# Patient Record
Sex: MALE | Race: WHITE | NOT HISPANIC OR LATINO | URBAN - METROPOLITAN AREA
[De-identification: names, ages, dates, MRNs, and addresses within clinical notes are randomized per-mention and may not be internally consistent; named-entity substitution may affect disease eponyms.]

---

## 2017-10-12 ENCOUNTER — INPATIENT (INPATIENT)
Facility: HOSPITAL | Age: 57
LOS: 2 days | Discharge: HOME | DRG: 440 | End: 2017-10-15
Attending: HOSPITALIST | Admitting: HOSPITALIST
Payer: COMMERCIAL

## 2017-10-12 VITALS
SYSTOLIC BLOOD PRESSURE: 165 MMHG | TEMPERATURE: 99 F | OXYGEN SATURATION: 100 % | HEART RATE: 91 BPM | RESPIRATION RATE: 19 BRPM | DIASTOLIC BLOOD PRESSURE: 97 MMHG

## 2017-10-12 DIAGNOSIS — K85.90 ACUTE PANCREATITIS WITHOUT NECROSIS OR INFECTION, UNSPECIFIED: ICD-10-CM

## 2017-10-12 LAB
ALBUMIN SERPL ELPH-MCNC: 4.5 G/DL — SIGNIFICANT CHANGE UP (ref 3.3–5)
ALP SERPL-CCNC: 83 U/L — SIGNIFICANT CHANGE UP (ref 40–120)
ALT FLD-CCNC: 12 U/L RC — SIGNIFICANT CHANGE UP (ref 10–45)
ANION GAP SERPL CALC-SCNC: 14 MMOL/L — SIGNIFICANT CHANGE UP (ref 5–17)
APPEARANCE UR: CLEAR — SIGNIFICANT CHANGE UP
APTT BLD: 28.8 SEC — SIGNIFICANT CHANGE UP (ref 27.5–37.4)
AST SERPL-CCNC: 24 U/L — SIGNIFICANT CHANGE UP (ref 10–40)
BASE EXCESS BLDV CALC-SCNC: 0.9 MMOL/L — SIGNIFICANT CHANGE UP (ref -2–2)
BASOPHILS # BLD AUTO: 0 K/UL — SIGNIFICANT CHANGE UP (ref 0–0.2)
BASOPHILS NFR BLD AUTO: 0.4 % — SIGNIFICANT CHANGE UP (ref 0–2)
BILIRUB SERPL-MCNC: 0.5 MG/DL — SIGNIFICANT CHANGE UP (ref 0.2–1.2)
BILIRUB UR-MCNC: NEGATIVE — SIGNIFICANT CHANGE UP
BUN SERPL-MCNC: 15 MG/DL — SIGNIFICANT CHANGE UP (ref 7–23)
CA-I SERPL-SCNC: 1.21 MMOL/L — SIGNIFICANT CHANGE UP (ref 1.12–1.3)
CALCIUM SERPL-MCNC: 9.5 MG/DL — SIGNIFICANT CHANGE UP (ref 8.4–10.5)
CHLORIDE BLDV-SCNC: 104 MMOL/L — SIGNIFICANT CHANGE UP (ref 96–108)
CHLORIDE SERPL-SCNC: 100 MMOL/L — SIGNIFICANT CHANGE UP (ref 96–108)
CO2 BLDV-SCNC: 27 MMOL/L — SIGNIFICANT CHANGE UP (ref 22–30)
CO2 SERPL-SCNC: 23 MMOL/L — SIGNIFICANT CHANGE UP (ref 22–31)
COLOR SPEC: SIGNIFICANT CHANGE UP
CREAT SERPL-MCNC: 0.97 MG/DL — SIGNIFICANT CHANGE UP (ref 0.5–1.3)
DIFF PNL FLD: NEGATIVE — SIGNIFICANT CHANGE UP
EOSINOPHIL # BLD AUTO: 0.3 K/UL — SIGNIFICANT CHANGE UP (ref 0–0.5)
EOSINOPHIL NFR BLD AUTO: 2.3 % — SIGNIFICANT CHANGE UP (ref 0–6)
GAS PNL BLDV: 137 MMOL/L — SIGNIFICANT CHANGE UP (ref 136–145)
GAS PNL BLDV: SIGNIFICANT CHANGE UP
GAS PNL BLDV: SIGNIFICANT CHANGE UP
GLUCOSE BLDV-MCNC: 107 MG/DL — HIGH (ref 70–99)
GLUCOSE SERPL-MCNC: 109 MG/DL — HIGH (ref 70–99)
GLUCOSE UR QL: NEGATIVE — SIGNIFICANT CHANGE UP
HCO3 BLDV-SCNC: 25 MMOL/L — SIGNIFICANT CHANGE UP (ref 21–29)
HCT VFR BLD CALC: 45 % — SIGNIFICANT CHANGE UP (ref 39–50)
HCT VFR BLDA CALC: 47 % — SIGNIFICANT CHANGE UP (ref 39–50)
HGB BLD CALC-MCNC: 15.4 G/DL — SIGNIFICANT CHANGE UP (ref 13–17)
HGB BLD-MCNC: 15.4 G/DL — SIGNIFICANT CHANGE UP (ref 13–17)
INR BLD: 1.05 RATIO — SIGNIFICANT CHANGE UP (ref 0.88–1.16)
KETONES UR-MCNC: NEGATIVE — SIGNIFICANT CHANGE UP
LACTATE BLDV-MCNC: 1.3 MMOL/L — SIGNIFICANT CHANGE UP (ref 0.7–2)
LEUKOCYTE ESTERASE UR-ACNC: NEGATIVE — SIGNIFICANT CHANGE UP
LIDOCAIN IGE QN: 39 U/L — SIGNIFICANT CHANGE UP (ref 7–60)
LYMPHOCYTES # BLD AUTO: 19.6 % — SIGNIFICANT CHANGE UP (ref 13–44)
LYMPHOCYTES # BLD AUTO: 2.2 K/UL — SIGNIFICANT CHANGE UP (ref 1–3.3)
MCHC RBC-ENTMCNC: 31 PG — SIGNIFICANT CHANGE UP (ref 27–34)
MCHC RBC-ENTMCNC: 34.3 GM/DL — SIGNIFICANT CHANGE UP (ref 32–36)
MCV RBC AUTO: 90.5 FL — SIGNIFICANT CHANGE UP (ref 80–100)
MONOCYTES # BLD AUTO: 1 K/UL — HIGH (ref 0–0.9)
MONOCYTES NFR BLD AUTO: 8.3 % — SIGNIFICANT CHANGE UP (ref 2–14)
NEUTROPHILS # BLD AUTO: 8 K/UL — HIGH (ref 1.8–7.4)
NEUTROPHILS NFR BLD AUTO: 69.4 % — SIGNIFICANT CHANGE UP (ref 43–77)
NITRITE UR-MCNC: NEGATIVE — SIGNIFICANT CHANGE UP
PCO2 BLDV: 42 MMHG — SIGNIFICANT CHANGE UP (ref 35–50)
PH BLDV: 7.4 — SIGNIFICANT CHANGE UP (ref 7.35–7.45)
PH UR: 6 — SIGNIFICANT CHANGE UP (ref 5–8)
PLATELET # BLD AUTO: 269 K/UL — SIGNIFICANT CHANGE UP (ref 150–400)
PO2 BLDV: 42 MMHG — SIGNIFICANT CHANGE UP (ref 25–45)
POTASSIUM BLDV-SCNC: 3.8 MMOL/L — SIGNIFICANT CHANGE UP (ref 3.5–5)
POTASSIUM SERPL-MCNC: 3.8 MMOL/L — SIGNIFICANT CHANGE UP (ref 3.5–5.3)
POTASSIUM SERPL-SCNC: 3.8 MMOL/L — SIGNIFICANT CHANGE UP (ref 3.5–5.3)
PROT SERPL-MCNC: 8 G/DL — SIGNIFICANT CHANGE UP (ref 6–8.3)
PROT UR-MCNC: NEGATIVE — SIGNIFICANT CHANGE UP
PROTHROM AB SERPL-ACNC: 11.5 SEC — SIGNIFICANT CHANGE UP (ref 9.8–12.7)
RBC # BLD: 4.97 M/UL — SIGNIFICANT CHANGE UP (ref 4.2–5.8)
RBC # FLD: 11.9 % — SIGNIFICANT CHANGE UP (ref 10.3–14.5)
SAO2 % BLDV: 76 % — SIGNIFICANT CHANGE UP (ref 67–88)
SODIUM SERPL-SCNC: 137 MMOL/L — SIGNIFICANT CHANGE UP (ref 135–145)
SP GR SPEC: >1.03 — HIGH (ref 1.01–1.02)
UROBILINOGEN FLD QL: NEGATIVE — SIGNIFICANT CHANGE UP
WBC # BLD: 11.5 K/UL — HIGH (ref 3.8–10.5)
WBC # FLD AUTO: 11.5 K/UL — HIGH (ref 3.8–10.5)

## 2017-10-12 PROCEDURE — 99285 EMERGENCY DEPT VISIT HI MDM: CPT

## 2017-10-12 PROCEDURE — 99223 1ST HOSP IP/OBS HIGH 75: CPT | Mod: GC

## 2017-10-12 RX ORDER — HYDROMORPHONE HYDROCHLORIDE 2 MG/ML
0.5 INJECTION INTRAMUSCULAR; INTRAVENOUS; SUBCUTANEOUS ONCE
Qty: 0 | Refills: 0 | Status: DISCONTINUED | OUTPATIENT
Start: 2017-10-12 | End: 2017-10-12

## 2017-10-12 RX ORDER — ONDANSETRON 8 MG/1
4 TABLET, FILM COATED ORAL ONCE
Qty: 0 | Refills: 0 | Status: COMPLETED | OUTPATIENT
Start: 2017-10-12 | End: 2017-10-12

## 2017-10-12 RX ORDER — SODIUM CHLORIDE 9 MG/ML
7000 INJECTION INTRAMUSCULAR; INTRAVENOUS; SUBCUTANEOUS
Qty: 0 | Refills: 0 | Status: DISCONTINUED | OUTPATIENT
Start: 2017-10-12 | End: 2017-10-13

## 2017-10-12 RX ORDER — HEPARIN SODIUM 5000 [USP'U]/ML
5000 INJECTION INTRAVENOUS; SUBCUTANEOUS EVERY 8 HOURS
Qty: 0 | Refills: 0 | Status: DISCONTINUED | OUTPATIENT
Start: 2017-10-12 | End: 2017-10-15

## 2017-10-12 RX ORDER — MORPHINE SULFATE 50 MG/1
4 CAPSULE, EXTENDED RELEASE ORAL ONCE
Qty: 0 | Refills: 0 | Status: DISCONTINUED | OUTPATIENT
Start: 2017-10-12 | End: 2017-10-12

## 2017-10-12 RX ADMIN — HYDROMORPHONE HYDROCHLORIDE 0.5 MILLIGRAM(S): 2 INJECTION INTRAMUSCULAR; INTRAVENOUS; SUBCUTANEOUS at 20:41

## 2017-10-12 RX ADMIN — SODIUM CHLORIDE 300 MILLILITER(S): 9 INJECTION INTRAMUSCULAR; INTRAVENOUS; SUBCUTANEOUS at 17:20

## 2017-10-12 RX ADMIN — HYDROMORPHONE HYDROCHLORIDE 0.5 MILLIGRAM(S): 2 INJECTION INTRAMUSCULAR; INTRAVENOUS; SUBCUTANEOUS at 18:17

## 2017-10-12 RX ADMIN — ONDANSETRON 4 MILLIGRAM(S): 8 TABLET, FILM COATED ORAL at 17:20

## 2017-10-12 RX ADMIN — MORPHINE SULFATE 4 MILLIGRAM(S): 50 CAPSULE, EXTENDED RELEASE ORAL at 17:20

## 2017-10-12 RX ADMIN — HYDROMORPHONE HYDROCHLORIDE 0.5 MILLIGRAM(S): 2 INJECTION INTRAMUSCULAR; INTRAVENOUS; SUBCUTANEOUS at 19:51

## 2017-10-12 RX ADMIN — HYDROMORPHONE HYDROCHLORIDE 0.5 MILLIGRAM(S): 2 INJECTION INTRAMUSCULAR; INTRAVENOUS; SUBCUTANEOUS at 21:00

## 2017-10-12 RX ADMIN — MORPHINE SULFATE 4 MILLIGRAM(S): 50 CAPSULE, EXTENDED RELEASE ORAL at 19:51

## 2017-10-12 NOTE — ED PROVIDER NOTE - PROGRESS NOTE DETAILS
Attending MD Montero: Patient reports that the only thing that helps his pain is "dilaudid" and that the morphine did nothing.  Will order Dilaudid 0.5mg.

## 2017-10-12 NOTE — ED PROVIDER NOTE - NOTES
GI at the bedside. wants 300cc/hr for the first day. pain meds and keep NPO Rich- Admit to full time hospitalist

## 2017-10-12 NOTE — ED ADULT NURSE REASSESSMENT NOTE - NS ED NURSE REASSESS COMMENT FT1
MD Montero aware pt. is requesting pain medication for pain of 8/10. MD Montero at pt.'s bedside. No orders for pain medication put in. Pt. and family aware that ARACELI Yoo is calling admitting to have admitting MD put in medication for pain and that orders should be put in by the time pt. arrives to room assignment on 3 [facility]. Admitting MD called at [phone] and pain medication orders to be put in. Receiving ARACELI Thomas on 3 [facility] aware.

## 2017-10-12 NOTE — CONSULT NOTE ADULT - SUBJECTIVE AND OBJECTIVE BOX
Patient is a 57y old  Male who presents with a chief complaint of abdominal pain    HPI: 57M sent from his PMD for recurrent abdominal pain and outside CT findings of pancreatitis.  The abdominal pain if left sided beginning yesterday, described as sharp and radiating to the left flank.  Diet did not make the food worse. No associated fever. No vomiting. No CP. No SOB.  Patient had his first episode of pancreatitis in 2014 with EUS findings consistent with pancreatitis. He had two subsequent episodes managed at [facility]. Previously checked IgG4 normal. No ETOH use. History of cholecystectomy.      PAST MEDICAL & SURGICAL HISTORY:  Liver disease: hepatitis c chronic  Cholelithiasis: s/p cholecystectomy 2014  Diverticulitis  Sleep apnea  Hyperlipemia  HTN (hypertension)  S/P cholecystectomy: 11/11/14 adn liver biopsy      MEDICATIONS  (STANDING):  sodium chloride 0.9%. 7000 milliLiter(s) (300 mL/Hr) IV Continuous <Continuous>    MEDICATIONS  (PRN):      Allergies    penicillins (Rash)    Intolerances        Review of Systems:    General:  No wt loss, fevers, chills, night sweats,fatigue,   CV:  No pain, palpitations, hypo/hypertension  Resp:  No dyspnea, cough, tachypnea, wheezing  GI:  See HPI  :  No pain, bleeding, incontinence, nocturia  Muscle:  No pain, weakness  Neuro:  No weakness, tingling, memory problems  Psych:  No fatigue, insomnia, mood problems, depression  Endocrine:  No polyuria, polydypsia, cold/heat intolerance  Heme:  No petechiae, ecchymosis, easy bruisability  Skin:  No rash, tattoos, scars, edema      Vital Signs Last 24 Hrs  T(C): 37 (12 Oct 2017 17:30), Max: 37.2 (12 Oct 2017 16:05)  T(F): 98.6 (12 Oct 2017 17:30), Max: 98.9 (12 Oct 2017 16:05)  HR: 86 (12 Oct 2017 17:30) (86 - 91)  BP: 159/99 (12 Oct 2017 17:30) (159/99 - 165/97)  BP(mean): --  RR: 18 (12 Oct 2017 17:30) (18 - 19)  SpO2: 100% (12 Oct 2017 17:30) (100% - 100%)    PHYSICAL EXAM:    Constitutional: NAD, well-developed  Neck: No LAD, supple  Respiratory: CTA and P  Cardiovascular: S1 and S2, RRR, no M  Gastrointestinal: BS+, soft, ND, neg HSM, left sided TTP  Extremities: No peripheral edema, neg clubbing, cyanosis  Vascular: 2+ peripheral pulses  Neurological: A/O x 3, no focal deficits  Psychiatric: Normal mood, normal affect  Skin: No rashes      LABS:                        15.4   11.5  )-----------( 269      ( 12 Oct 2017 17:23 )             45.0     10-12    137  |  100  |  15  ----------------------------<  109<H>  3.8   |  23  |  0.97    Ca    9.5      12 Oct 2017 17:23    TPro  8.0  /  Alb  4.5  /  TBili  0.5  /  DBili  x   /  AST  24  /  ALT  12  /  AlkPhos  83  10-12    PT/INR - ( 12 Oct 2017 17:23 )   PT: 11.5 sec;   INR: 1.05 ratio         PTT - ( 12 Oct 2017 17:23 )  PTT:28.8 sec    LIVER FUNCTIONS - ( 12 Oct 2017 17:23 )  Alb: 4.5 g/dL / Pro: 8.0 g/dL / ALK PHOS: 83 U/L / ALT: 12 U/L RC / AST: 24 U/L / GGT: x           Lipase, Serum: 39 U/L (10-12-17 @ 17:23)        RADIOLOGY & ADDITIONAL TESTS: Awaiting CD upload

## 2017-10-12 NOTE — ED ADULT TRIAGE NOTE - CHIEF COMPLAINT QUOTE
"I have pancreatitis" sent from primary care after blood work   I have been having abdominal pain since yesterday.

## 2017-10-12 NOTE — ED PROVIDER NOTE - MEDICAL DECISION MAKING DETAILS
56 yo M with pmhx cholecystectomy, depression, htn and pancreatitis presenting with pancreatitis seen on outpatient studying. IVF/bw/morphine/zofran/ua/GIconsult/admission

## 2017-10-12 NOTE — ED PROVIDER NOTE - FAMILY HISTORY
Mother  Still living? Unknown  Family history of thrombosis or embolism, Age at diagnosis: Age Unknown

## 2017-10-12 NOTE — ED ADULT NURSE REASSESSMENT NOTE - NS ED NURSE REASSESS COMMENT FT1
Report given to change of shift RN.  Easy work of breathing.  Med lock intact.  Pt resting comfortably. NAD. Wife at bedside.  PT disconnected to ambulate to bathroom, education provided on clean catch.

## 2017-10-12 NOTE — ED PROVIDER NOTE - ATTENDING CONTRIBUTION TO CARE
Attending MD Montero:   I personally have seen and examined this patient.  Physician assistant note reviewed and agree on plan of care and except where noted.  See below for details.     57M with PMH HTN, depression, pancreatitis PSH cholecystectomy sent in to the ED for pancreatitis.  Reports that he has been having epigastric pain which worsened markedly yesterday.  Reports radiates to his back.  Reports symptoms associated with nausea and diarrhea, nonbloody.  Reports went to see PMD who sent him for outpatient CT which he reports showed pancreatitis and was sent in to ED.  Reports last pancreatitis episode was in 2014.  Reports endoscopy about 1.5-2 years ago.  Denies chest pain, shortness of breath, palpitations. Denies fevers, chills, dizziness, weakness, change in vision. Denies emesis. Denies dysuria, hematuria, change in urinary habits including frequency, urgency. On exam, head NCAT, PERRL, FROM at neck, no tenderness to palpation or stepoffs along length of spine, lungs CTAB with good inspiratory effort, +S1S2, no m/r/g, abdomen soft with +BS, +tenderness to palpation in epigastrum, no rebound, no guarding, ND, no CVAT, moving all extremities with 5/5 strength bilateral upper and lower extremities, good and equal  strength bilaterally; A/P: 57M with reported pancreatitis on CT, will obtain labs, including lipase, CXR, EKG, IVF, npo, pain control, patient was seen by his GI already Dr. Zavala who recommended npo, pan control, IVFs, admission, will admit patient

## 2017-10-12 NOTE — ED PROVIDER NOTE - PMH
Cholelithiasis  s/p cholecystectomy 2014  Chronic pancreatitis    Diverticulitis    HTN (hypertension)    Hyperlipemia    Liver disease  hepatitis c chronic  Sleep apnea

## 2017-10-12 NOTE — CONSULT NOTE ADULT - ASSESSMENT
57M presenting with abdominal pain and outside CT findings reportedly c/w pancreatitis.    BISAP 0  Aggressive IV fluids x 24 hrs   NPO for now  Pain Control  Home medications reviewed - Stop losartan  Will review CT imaging once uploaded  Check triglycerides  Previous IgG4 checked and normal.

## 2017-10-12 NOTE — ED PROVIDER NOTE - OBJECTIVE STATEMENT
58 yo M with pmhx cholecystectomy, depression, htn and pancreatitis presenting with pancreatitis seen on outpatient studying. Patient states he began with epigastric pain that has been constant since yesterday. Pain is 8/10 and radiates to her back. Patient admits to nausea and a few episodes of diarrhea. Patient sent by PCP after CT today. Patient denies fever, cp, sob, cough, vomiting, tingling, numbness, weakness, ha, dysuria, hematuria and neck pain 58 yo M with pmhx cholecystectomy, depression, htn and pancreatitis presenting with pancreatitis seen on outpatient studying. Patient states he began with epigastric pain that has been constant since yesterday. Pain is 8/10 and radiates to his back. Patient admits to nausea and a few episodes of diarrhea. Patient sent by PCP after CT today. Patient denies fever, cp, sob, cough, vomiting, tingling, numbness, weakness, ha, dysuria, hematuria and neck pain

## 2017-10-12 NOTE — ED ADULT NURSE REASSESSMENT NOTE - NS ED NURSE REASSESS COMMENT FT1
report taken from Mishel CHARLES. Pt. A&Ox3. VSS. Awaiting bed assignment. Pt. reports pain. MD aware

## 2017-10-12 NOTE — ED ADULT NURSE NOTE - OBJECTIVE STATEMENT
56 y/o M ambulatory to ED with steady gait accompanied y wife c/o 1 day LUQ pain with nausea, feels like prior pancreatitis. A&Ox4.  No dizziness.  No SOB.  Easy work of breathing.  Lungs clear and equal to auscultation.  Med lock 18 R forearm placed.  No chest pain, numbness/tingling, edema.  Abd soft round tender diffuse.  +nausea, no vomiting or diarrhea.  Skin warm dry and intact.  No rashes. Pt had outpt CT scan, GI physician took CD to radiology to be uploaded.  Will continue to monitor.  NAD.

## 2017-10-13 DIAGNOSIS — Z29.9 ENCOUNTER FOR PROPHYLACTIC MEASURES, UNSPECIFIED: ICD-10-CM

## 2017-10-13 DIAGNOSIS — F32.9 MAJOR DEPRESSIVE DISORDER, SINGLE EPISODE, UNSPECIFIED: ICD-10-CM

## 2017-10-13 DIAGNOSIS — R63.8 OTHER SYMPTOMS AND SIGNS CONCERNING FOOD AND FLUID INTAKE: ICD-10-CM

## 2017-10-13 DIAGNOSIS — K85.90 ACUTE PANCREATITIS WITHOUT NECROSIS OR INFECTION, UNSPECIFIED: ICD-10-CM

## 2017-10-13 DIAGNOSIS — I10 ESSENTIAL (PRIMARY) HYPERTENSION: ICD-10-CM

## 2017-10-13 DIAGNOSIS — G47.33 OBSTRUCTIVE SLEEP APNEA (ADULT) (PEDIATRIC): ICD-10-CM

## 2017-10-13 LAB
ALBUMIN SERPL ELPH-MCNC: 3.3 G/DL — SIGNIFICANT CHANGE UP (ref 3.3–5)
ALP SERPL-CCNC: 64 U/L — SIGNIFICANT CHANGE UP (ref 40–120)
ALT FLD-CCNC: 9 U/L — LOW (ref 10–45)
ANION GAP SERPL CALC-SCNC: 10 MMOL/L — SIGNIFICANT CHANGE UP (ref 5–17)
APTT BLD: 27.7 SEC — SIGNIFICANT CHANGE UP (ref 27.5–37.4)
AST SERPL-CCNC: 20 U/L — SIGNIFICANT CHANGE UP (ref 10–40)
BASOPHILS # BLD AUTO: 0.03 K/UL — SIGNIFICANT CHANGE UP (ref 0–0.2)
BASOPHILS NFR BLD AUTO: 0.5 % — SIGNIFICANT CHANGE UP (ref 0–2)
BILIRUB SERPL-MCNC: 0.5 MG/DL — SIGNIFICANT CHANGE UP (ref 0.2–1.2)
BUN SERPL-MCNC: 11 MG/DL — SIGNIFICANT CHANGE UP (ref 7–23)
CALCIUM SERPL-MCNC: 8.1 MG/DL — LOW (ref 8.4–10.5)
CHLORIDE SERPL-SCNC: 107 MMOL/L — SIGNIFICANT CHANGE UP (ref 96–108)
CHOLEST SERPL-MCNC: 180 MG/DL — SIGNIFICANT CHANGE UP (ref 10–199)
CO2 SERPL-SCNC: 24 MMOL/L — SIGNIFICANT CHANGE UP (ref 22–31)
CREAT SERPL-MCNC: 0.78 MG/DL — SIGNIFICANT CHANGE UP (ref 0.5–1.3)
EOSINOPHIL # BLD AUTO: 0.3 K/UL — SIGNIFICANT CHANGE UP (ref 0–0.5)
EOSINOPHIL NFR BLD AUTO: 4.7 % — SIGNIFICANT CHANGE UP (ref 0–6)
GLUCOSE SERPL-MCNC: 90 MG/DL — SIGNIFICANT CHANGE UP (ref 70–99)
HCT VFR BLD CALC: 38.4 % — LOW (ref 39–50)
HDLC SERPL-MCNC: 47 MG/DL — SIGNIFICANT CHANGE UP (ref 40–125)
HGB BLD-MCNC: 13.3 G/DL — SIGNIFICANT CHANGE UP (ref 13–17)
IMM GRANULOCYTES NFR BLD AUTO: 0.8 % — SIGNIFICANT CHANGE UP (ref 0–1.5)
INR BLD: 1.03 RATIO — SIGNIFICANT CHANGE UP (ref 0.88–1.16)
LIPID PNL WITH DIRECT LDL SERPL: 113 MG/DL — SIGNIFICANT CHANGE UP
LYMPHOCYTES # BLD AUTO: 1.99 K/UL — SIGNIFICANT CHANGE UP (ref 1–3.3)
LYMPHOCYTES # BLD AUTO: 31.4 % — SIGNIFICANT CHANGE UP (ref 13–44)
MAGNESIUM SERPL-MCNC: 2 MG/DL — SIGNIFICANT CHANGE UP (ref 1.6–2.6)
MCHC RBC-ENTMCNC: 30.2 PG — SIGNIFICANT CHANGE UP (ref 27–34)
MCHC RBC-ENTMCNC: 34.6 GM/DL — SIGNIFICANT CHANGE UP (ref 32–36)
MCV RBC AUTO: 87.1 FL — SIGNIFICANT CHANGE UP (ref 80–100)
MONOCYTES # BLD AUTO: 0.69 K/UL — SIGNIFICANT CHANGE UP (ref 0–0.9)
MONOCYTES NFR BLD AUTO: 10.9 % — SIGNIFICANT CHANGE UP (ref 2–14)
NEUTROPHILS # BLD AUTO: 3.28 K/UL — SIGNIFICANT CHANGE UP (ref 1.8–7.4)
NEUTROPHILS NFR BLD AUTO: 51.7 % — SIGNIFICANT CHANGE UP (ref 43–77)
PHOSPHATE SERPL-MCNC: 2.8 MG/DL — SIGNIFICANT CHANGE UP (ref 2.5–4.5)
PLATELET # BLD AUTO: 213 K/UL — SIGNIFICANT CHANGE UP (ref 150–400)
POTASSIUM SERPL-MCNC: 4.2 MMOL/L — SIGNIFICANT CHANGE UP (ref 3.5–5.3)
POTASSIUM SERPL-SCNC: 4.2 MMOL/L — SIGNIFICANT CHANGE UP (ref 3.5–5.3)
PROT SERPL-MCNC: 6.2 G/DL — SIGNIFICANT CHANGE UP (ref 6–8.3)
PROTHROM AB SERPL-ACNC: 11.6 SEC — SIGNIFICANT CHANGE UP (ref 10–13.1)
RBC # BLD: 4.41 M/UL — SIGNIFICANT CHANGE UP (ref 4.2–5.8)
RBC # FLD: 13.2 % — SIGNIFICANT CHANGE UP (ref 10.3–14.5)
SODIUM SERPL-SCNC: 141 MMOL/L — SIGNIFICANT CHANGE UP (ref 135–145)
TOTAL CHOLESTEROL/HDL RATIO MEASUREMENT: 3.8 RATIO — SIGNIFICANT CHANGE UP (ref 3.4–9.6)
TRIGL SERPL-MCNC: 98 MG/DL — SIGNIFICANT CHANGE UP (ref 10–149)
WBC # BLD: 6.34 K/UL — SIGNIFICANT CHANGE UP (ref 3.8–10.5)
WBC # FLD AUTO: 6.34 K/UL — SIGNIFICANT CHANGE UP (ref 3.8–10.5)

## 2017-10-13 PROCEDURE — 99232 SBSQ HOSP IP/OBS MODERATE 35: CPT | Mod: GC

## 2017-10-13 PROCEDURE — 93010 ELECTROCARDIOGRAM REPORT: CPT

## 2017-10-13 RX ORDER — MORPHINE SULFATE 50 MG/1
2 CAPSULE, EXTENDED RELEASE ORAL EVERY 4 HOURS
Qty: 0 | Refills: 0 | Status: DISCONTINUED | OUTPATIENT
Start: 2017-10-13 | End: 2017-10-13

## 2017-10-13 RX ORDER — HYDROMORPHONE HYDROCHLORIDE 2 MG/ML
0.5 INJECTION INTRAMUSCULAR; INTRAVENOUS; SUBCUTANEOUS ONCE
Qty: 0 | Refills: 0 | Status: DISCONTINUED | OUTPATIENT
Start: 2017-10-13 | End: 2017-10-13

## 2017-10-13 RX ORDER — MORPHINE SULFATE 50 MG/1
2 CAPSULE, EXTENDED RELEASE ORAL EVERY 4 HOURS
Qty: 0 | Refills: 0 | Status: DISCONTINUED | OUTPATIENT
Start: 2017-10-13 | End: 2017-10-14

## 2017-10-13 RX ORDER — MORPHINE SULFATE 50 MG/1
2 CAPSULE, EXTENDED RELEASE ORAL EVERY 6 HOURS
Qty: 0 | Refills: 0 | Status: DISCONTINUED | OUTPATIENT
Start: 2017-10-13 | End: 2017-10-13

## 2017-10-13 RX ORDER — ACETAMINOPHEN 500 MG
650 TABLET ORAL EVERY 6 HOURS
Qty: 0 | Refills: 0 | Status: DISCONTINUED | OUTPATIENT
Start: 2017-10-13 | End: 2017-10-15

## 2017-10-13 RX ORDER — FLUOXETINE HCL 10 MG
40 CAPSULE ORAL DAILY
Qty: 0 | Refills: 0 | Status: DISCONTINUED | OUTPATIENT
Start: 2017-10-13 | End: 2017-10-15

## 2017-10-13 RX ORDER — BUPROPION HYDROCHLORIDE 150 MG/1
300 TABLET, EXTENDED RELEASE ORAL DAILY
Qty: 0 | Refills: 0 | Status: DISCONTINUED | OUTPATIENT
Start: 2017-10-13 | End: 2017-10-15

## 2017-10-13 RX ORDER — MODAFINIL 200 MG/1
1 TABLET ORAL
Qty: 0 | Refills: 0 | COMMUNITY

## 2017-10-13 RX ORDER — LAMOTRIGINE 25 MG/1
100 TABLET, ORALLY DISINTEGRATING ORAL DAILY
Qty: 0 | Refills: 0 | Status: DISCONTINUED | OUTPATIENT
Start: 2017-10-13 | End: 2017-10-15

## 2017-10-13 RX ORDER — IBUPROFEN 200 MG
400 TABLET ORAL ONCE
Qty: 0 | Refills: 0 | Status: COMPLETED | OUTPATIENT
Start: 2017-10-13 | End: 2017-10-13

## 2017-10-13 RX ORDER — AMLODIPINE BESYLATE 2.5 MG/1
1 TABLET ORAL
Qty: 0 | Refills: 0 | COMMUNITY

## 2017-10-13 RX ORDER — HYDROMORPHONE HYDROCHLORIDE 2 MG/ML
1 INJECTION INTRAMUSCULAR; INTRAVENOUS; SUBCUTANEOUS EVERY 4 HOURS
Qty: 0 | Refills: 0 | Status: DISCONTINUED | OUTPATIENT
Start: 2017-10-13 | End: 2017-10-13

## 2017-10-13 RX ORDER — BREXPIPRAZOLE 0.25 MG/1
0 TABLET ORAL
Qty: 0 | Refills: 0 | COMMUNITY

## 2017-10-13 RX ORDER — SODIUM CHLORIDE 9 MG/ML
1000 INJECTION INTRAMUSCULAR; INTRAVENOUS; SUBCUTANEOUS
Qty: 0 | Refills: 0 | Status: DISCONTINUED | OUTPATIENT
Start: 2017-10-13 | End: 2017-10-15

## 2017-10-13 RX ORDER — HYDROMORPHONE HYDROCHLORIDE 2 MG/ML
0.5 INJECTION INTRAMUSCULAR; INTRAVENOUS; SUBCUTANEOUS EVERY 6 HOURS
Qty: 0 | Refills: 0 | Status: DISCONTINUED | OUTPATIENT
Start: 2017-10-13 | End: 2017-10-13

## 2017-10-13 RX ORDER — PANTOPRAZOLE SODIUM 20 MG/1
40 TABLET, DELAYED RELEASE ORAL
Qty: 0 | Refills: 0 | Status: DISCONTINUED | OUTPATIENT
Start: 2017-10-13 | End: 2017-10-13

## 2017-10-13 RX ORDER — MODAFINIL 200 MG/1
100 TABLET ORAL DAILY
Qty: 0 | Refills: 0 | Status: DISCONTINUED | OUTPATIENT
Start: 2017-10-13 | End: 2017-10-15

## 2017-10-13 RX ORDER — AMLODIPINE BESYLATE 2.5 MG/1
5 TABLET ORAL DAILY
Qty: 0 | Refills: 0 | Status: DISCONTINUED | OUTPATIENT
Start: 2017-10-13 | End: 2017-10-15

## 2017-10-13 RX ORDER — SODIUM CHLORIDE 9 MG/ML
1000 INJECTION INTRAMUSCULAR; INTRAVENOUS; SUBCUTANEOUS
Qty: 0 | Refills: 0 | Status: DISCONTINUED | OUTPATIENT
Start: 2017-10-13 | End: 2017-10-13

## 2017-10-13 RX ORDER — PANTOPRAZOLE SODIUM 20 MG/1
40 TABLET, DELAYED RELEASE ORAL
Qty: 0 | Refills: 0 | Status: DISCONTINUED | OUTPATIENT
Start: 2017-10-13 | End: 2017-10-15

## 2017-10-13 RX ORDER — ACETAMINOPHEN 500 MG
975 TABLET ORAL EVERY 6 HOURS
Qty: 0 | Refills: 0 | Status: DISCONTINUED | OUTPATIENT
Start: 2017-10-13 | End: 2017-10-13

## 2017-10-13 RX ADMIN — BUPROPION HYDROCHLORIDE 300 MILLIGRAM(S): 150 TABLET, EXTENDED RELEASE ORAL at 12:04

## 2017-10-13 RX ADMIN — HYDROMORPHONE HYDROCHLORIDE 0.5 MILLIGRAM(S): 2 INJECTION INTRAMUSCULAR; INTRAVENOUS; SUBCUTANEOUS at 21:49

## 2017-10-13 RX ADMIN — HYDROMORPHONE HYDROCHLORIDE 1 MILLIGRAM(S): 2 INJECTION INTRAMUSCULAR; INTRAVENOUS; SUBCUTANEOUS at 03:15

## 2017-10-13 RX ADMIN — Medication 400 MILLIGRAM(S): at 01:23

## 2017-10-13 RX ADMIN — HYDROMORPHONE HYDROCHLORIDE 1 MILLIGRAM(S): 2 INJECTION INTRAMUSCULAR; INTRAVENOUS; SUBCUTANEOUS at 08:38

## 2017-10-13 RX ADMIN — AMLODIPINE BESYLATE 5 MILLIGRAM(S): 2.5 TABLET ORAL at 06:24

## 2017-10-13 RX ADMIN — SODIUM CHLORIDE 75 MILLILITER(S): 9 INJECTION INTRAMUSCULAR; INTRAVENOUS; SUBCUTANEOUS at 12:38

## 2017-10-13 RX ADMIN — Medication 400 MILLIGRAM(S): at 01:53

## 2017-10-13 RX ADMIN — MORPHINE SULFATE 2 MILLIGRAM(S): 50 CAPSULE, EXTENDED RELEASE ORAL at 18:51

## 2017-10-13 RX ADMIN — Medication 40 MILLIGRAM(S): at 12:03

## 2017-10-13 RX ADMIN — SODIUM CHLORIDE 200 MILLILITER(S): 9 INJECTION INTRAMUSCULAR; INTRAVENOUS; SUBCUTANEOUS at 06:24

## 2017-10-13 RX ADMIN — HYDROMORPHONE HYDROCHLORIDE 1 MILLIGRAM(S): 2 INJECTION INTRAMUSCULAR; INTRAVENOUS; SUBCUTANEOUS at 02:51

## 2017-10-13 RX ADMIN — PANTOPRAZOLE SODIUM 40 MILLIGRAM(S): 20 TABLET, DELAYED RELEASE ORAL at 12:41

## 2017-10-13 RX ADMIN — Medication 975 MILLIGRAM(S): at 12:37

## 2017-10-13 RX ADMIN — HEPARIN SODIUM 5000 UNIT(S): 5000 INJECTION INTRAVENOUS; SUBCUTANEOUS at 21:50

## 2017-10-13 RX ADMIN — MODAFINIL 100 MILLIGRAM(S): 200 TABLET ORAL at 12:02

## 2017-10-13 RX ADMIN — HYDROMORPHONE HYDROCHLORIDE 0.5 MILLIGRAM(S): 2 INJECTION INTRAMUSCULAR; INTRAVENOUS; SUBCUTANEOUS at 22:12

## 2017-10-13 RX ADMIN — HEPARIN SODIUM 5000 UNIT(S): 5000 INJECTION INTRAVENOUS; SUBCUTANEOUS at 06:24

## 2017-10-13 RX ADMIN — HEPARIN SODIUM 5000 UNIT(S): 5000 INJECTION INTRAVENOUS; SUBCUTANEOUS at 14:19

## 2017-10-13 RX ADMIN — LAMOTRIGINE 100 MILLIGRAM(S): 25 TABLET, ORALLY DISINTEGRATING ORAL at 12:03

## 2017-10-13 NOTE — H&P ADULT - ASSESSMENT
57 y.o male w/ pmhx of HTN, CLEMENT on CPAP, chronic hep C s/p treatment, Depression, chronic pancreatitis (w/ recurrent bouts most recently in 8/2017 at [facility]) presenting w/ c/o of epigastric abdominal pain sent from his PMD  CT findings and blood work consistent w/ acute pancreatitis.

## 2017-10-13 NOTE — H&P ADULT - NSHPPHYSICALEXAM_GEN_ALL_CORE
Vital Signs Last 24 Hrs  T(C): 36.6 (12 Oct 2017 20:20), Max: 37.2 (12 Oct 2017 16:05)  T(F): 97.9 (12 Oct 2017 20:20), Max: 98.9 (12 Oct 2017 16:05)  HR: 76 (12 Oct 2017 20:20) (76 - 91)  BP: 150/97 (12 Oct 2017 20:20) (150/97 - 165/97)  BP(mean): --  RR: 18 (12 Oct 2017 20:20) (18 - 19)  SpO2: 98% (12 Oct 2017 20:20) (98% - 100%)  PHYSICAL EXAM:  GENERAL: NAD, well-groomed, well-developed  HEAD:  Atraumatic, Normocephalic  EYES: EOMI, PERRLA, conjunctiva and sclera clear  ENMT: No tonsillar erythema, exudates, or enlargement; Moist mucous membranes, Good dentition, No lesions  NECK: Supple, No JVD, Normal thyroid  CHEST/LUNG: Clear to percussion bilaterally; No rales, rhonchi, wheezing, or rubs  HEART: Regular rate and rhythm; No murmurs, rubs, or gallops  ABDOMEN: Soft, Nontender, Nondistended; Bowel sounds present  EXTREMITIES:  2+ Peripheral Pulses, No clubbing, cyanosis, or edema  LYMPH: No lymphadenopathy noted  SKIN: No rashes or lesions  NERVOUS SYSTEM:  Alert & Oriented X3, Good concentration; Motor Strength 5/5 B/L upper and lower extremities; DTRs 2+ intact and symmetric Vital Signs Last 24 Hrs  T(C): 36.6 (12 Oct 2017 20:20), Max: 37.2 (12 Oct 2017 16:05)  T(F): 97.9 (12 Oct 2017 20:20), Max: 98.9 (12 Oct 2017 16:05)  HR: 76 (12 Oct 2017 20:20) (76 - 91)  BP: 150/97 (12 Oct 2017 20:20) (150/97 - 165/97)  BP(mean): --  RR: 18 (12 Oct 2017 20:20) (18 - 19)  SpO2: 98% (12 Oct 2017 20:20) (98% - 100%)  PHYSICAL EXAM:  GENERAL: NAD, well-groomed, well-developed  HEAD:  Atraumatic, Normocephalic  EYES: EOMI, PERRLA, conjunctiva and sclera clear  ENMT: No tonsillar erythema, exudates, or enlargement; Moist mucous membranes, Good dentition, No lesions  NECK: Supple, No JVD, Normal thyroid  CHEST/LUNG: Clear to percussion bilaterally; No rales, rhonchi, wheezing, or rubs  HEART: Regular rate and rhythm; No murmurs, rubs, or gallops  ABDOMEN: Soft, moderate tenderness in epigastric area, Nondistended; no guarding, Bowel sounds present  EXTREMITIES:  2+ Peripheral Pulses, No clubbing, cyanosis, or edema  LYMPH: No lymphadenopathy noted  SKIN: No rashes or lesions  NERVOUS SYSTEM:  Alert & Oriented X3, Good concentration; Motor Strength 5/5 B/L upper and lower extremities; DTRs 2+ intact and symmetric Vital Signs Last 24 Hrs  T(C): 36.6 (12 Oct 2017 20:20), Max: 37.2 (12 Oct 2017 16:05)  T(F): 97.9 (12 Oct 2017 20:20), Max: 98.9 (12 Oct 2017 16:05)  HR: 76 (12 Oct 2017 20:20) (76 - 91)  BP: 150/97 (12 Oct 2017 20:20) (150/97 - 165/97)  BP(mean): --  RR: 18 (12 Oct 2017 20:20) (18 - 19)  SpO2: 98% (12 Oct 2017 20:20) (98% - 100%)  PHYSICAL EXAM:  GENERAL: NAD, well-groomed, well-developed  HEAD:  Atraumatic, Normocephalic  EYES: EOMI, PERRLA, conjunctiva and sclera clear  ENMT: No tonsillar erythema, exudates, or enlargement; Moist mucous membranes, Good dentition, No lesions  NECK: Supple, No JVD, Normal thyroid  CHEST/LUNG: Clear to percussion bilaterally; No rales, rhonchi, wheezing, or rubs  HEART: Regular rate and rhythm; No murmurs, rubs, or gallops  ABDOMEN: Soft, moderate tenderness in epigastric area, Nondistended; no guarding, Bowel sounds present, anderson's sign negative  EXTREMITIES:  2+ Peripheral Pulses, No clubbing, cyanosis, or edema  LYMPH: No lymphadenopathy noted  SKIN: No rashes or lesions  NERVOUS SYSTEM:  Alert & Oriented X3, Good concentration; Motor Strength 5/5 B/L upper and lower extremities; DTRs 2+ intact and symmetric

## 2017-10-13 NOTE — PROGRESS NOTE ADULT - ASSESSMENT
57 y.o male w/ pmhx of HTN, CLEMENT on CPAP, chronic hep C s/p treatment, Depression, chronic pancreatitis (w/ recurrent bouts most recently in 8/2017 at [facility]) presenting w/ c/o of epigastric abdominal pain sent from his PMD  CT findings and blood work consistent w/ acute pancreatitis.

## 2017-10-13 NOTE — CONSULT NOTE ADULT - SUBJECTIVE AND OBJECTIVE BOX
Patient is a 57y old  Male who presents with a chief complaint of abdominal pain (13 Oct 2017 00:02)      HPI:  58 yo male HTN, chronic pancreatitis admitted with acute on chronic pancreatitis, confirmed on CT scan.  Pt denies cp or sob.  PAST MEDICAL & SURGICAL HISTORY:  Chronic pancreatitis  Liver disease: hepatitis c chronic  Cholelithiasis: s/p cholecystectomy 2014  Diverticulitis  Sleep apnea  Hyperlipemia  HTN (hypertension)  S/P cholecystectomy: 11/11/14 adn liver biopsy      Allergies    penicillins (Rash)    Intolerances    Home Medications:   * Patient Currently Takes Medications as of 13-Oct-2017 01:05 documented in Structured Notes  · 	Cozaar 100 mg oral tablet: 1 tab(s) orally once a day, Last Dose Taken:    · 	Wellbutrin 300 mg/24 hours oral tablet, extended release: 1 tab(s) orally every 24 hours, Last Dose Taken:    · 	LaMICtal 100 mg oral tablet:  orally once a day (in the evening), Last Dose Taken:    · 	FLUoxetine 40 mg oral capsule:  orally once a day (in the evening), Last Dose Taken:    · 	Rexulti: 1mg once a day, Last Dose Taken:    · 	Provigil 100 mg oral tablet: 1 tab(s) orally once a day (in the morning), Last Dose Taken:    · 	amLODIPine 2.5 mg oral tablet: 1 tab(s) orally once a day, Last Dose Taken:          MEDICATIONS  (STANDING):  amLODIPine   Tablet 5 milliGRAM(s) Oral daily  buPROPion XL . 300 milliGRAM(s) Oral daily  FLUoxetine 40 milliGRAM(s) Oral daily  heparin  Injectable 5000 Unit(s) SubCutaneous every 8 hours  lamoTRIgine 100 milliGRAM(s) Oral daily  modafinil 100 milliGRAM(s) Oral daily  sodium chloride 0.9%. 1000 milliLiter(s) (200 mL/Hr) IV Continuous <Continuous>    MEDICATIONS  (PRN):  HYDROmorphone  Injectable 1 milliGRAM(s) IV Push every 4 hours PRN Severe Pain (7 - 10)      SH neg cigs neg Etoh    FAMILY HISTORY:  Family history of thrombosis or embolism (Mother)    ROS as above other systems negative        Vital Signs Last 24 Hrs  T(C): 36.4 (13 Oct 2017 08:10), Max: 37.2 (12 Oct 2017 16:05)  T(F): 97.5 (13 Oct 2017 08:10), Max: 98.9 (12 Oct 2017 16:05)  HR: 73 (13 Oct 2017 09:09) (73 - 91)  BP: 104/71 (13 Oct 2017 08:10) (104/71 - 165/97)  BP(mean): --  RR: 18 (13 Oct 2017 08:10) (18 - 19)  SpO2: 97% (13 Oct 2017 09:09) (95% - 100%)  Daily Height in cm: 170.18 (12 Oct 2017 20:20)    Daily   I&O's Detail    12 Oct 2017 07:01  -  13 Oct 2017 07:00  --------------------------------------------------------  IN:    Oral Fluid: 160 mL    sodium chloride 0.9%: 1800 mL    sodium chloride 0.9%.: 1200 mL  Total IN: 3160 mL    OUT:  Total OUT: 0 mL    Total NET: 3160 mL          Physical Exam  Pt feeling better less pain  Neck supple without JVD  Lungs clear  cor s1s2 without m,r,g  Abd soft Bs +  Ext without edema  Pulses +2 DP      LABS  PT/INR - ( 13 Oct 2017 08:37 )   PT: 11.6 sec;   INR: 1.03 ratio         PTT - ( 13 Oct 2017 08:37 )  PTT:27.7 sec  Urinalysis Basic - ( 12 Oct 2017 20:23 )    Color: PL Yellow / Appearance: Clear / SG: >1.030 / pH: x  Gluc: x / Ketone: Negative  / Bili: Negative / Urobili: Negative   Blood: x / Protein: Negative / Nitrite: Negative   Leuk Esterase: Negative / RBC: x / WBC x   Sq Epi: x / Non Sq Epi: x / Bacteria: x          CBC Full  -  ( 13 Oct 2017 08:39 )  WBC Count : 6.34 K/uL  Hemoglobin : 13.3 g/dL  Hematocrit : 38.4 %  Platelet Count - Automated : 213 K/uL  Mean Cell Volume : 87.1 fl  Mean Cell Hemoglobin : 30.2 pg  Mean Cell Hemoglobin Concentration : 34.6 gm/dL  Auto Neutrophil # : 3.28 K/uL  Auto Lymphocyte # : 1.99 K/uL  Auto Monocyte # : 0.69 K/uL  Auto Eosinophil # : 0.30 K/uL  Auto Basophil # : 0.03 K/uL  Auto Neutrophil % : 51.7 %  Auto Lymphocyte % : 31.4 %  Auto Monocyte % : 10.9 %  Auto Eosinophil % : 4.7 %  Auto Basophil % : 0.5 %    10-13    141  |  107  |  11  ----------------------------<  90  4.2   |  24  |  0.78    Ca    8.1<L>      13 Oct 2017 08:34  Phos  2.8     10-13  Mg     2.0     10-13    TPro  6.2  /  Alb  3.3  /  TBili  0.5  /  DBili  x   /  AST  20  /  ALT  9<L>  /  AlkPhos  64  10-13    Lipid [id number] @ 08:34  180  113  47  3.8  98      ECG: Pending        Assessment and Plan  58 yo male HTN, chronic pancreatitis admitted with acute on chronic pancreatitis, confirmed on CT scan.  Losartan dc'd (rare cause of pancreatitis)  continue amlodipine

## 2017-10-13 NOTE — H&P ADULT - HISTORY OF PRESENT ILLNESS
57 y.o male w/ pmhx of HTN, CLEMENT on CPAP, chronic hep C s/p treatment, Depression, chronic pancreatitis (w/ recurrent bouts most recently in 8/2017 at [facility]) presenting w/ c/o of epigastric abdominal pain sent from his PMD for  outside CT findings of acute pancreatitis.     Patient had complaints of burning 8/10 epigastric pain radiating to the back since 2 days PTA. He had this pain soon after eating breakfast ([place] toast), and it began to get progressively worse during they day. It was associated w/ nausea, loss of appetite, and two loose BM's. Pain was worse after eating. No complaints of CP, palpitations, fevers/chills, blood in stool or urine. No one else in the house is sick. No recent infections or use of abx. Patient went to his GI doctor, Dr. Ragland, who did blood work and CT imaging of his belly and told him to go the ED for management of pancreatitis. Had cholecystectomy in 2014. no alcohol use.     Of note, had EUS in 2014: demonstrated sonographic changes consistent with mild-moderate chronic pancreatitis,    Currently, pain has reduced to 3/10 after having pain medication, nausea has subsided.    ED vitals: 98.9, HR 90, /97-->158/98, 18, 100% RA  In the ED: given dilaudid 0.5mg x 2, nmorphine 4mg x 1, zofran x 1 Patient was seen and examined at 11:00pm on 10/12/17    57 y.o male w/ pmhx of HTN, CLEMENT on CPAP, chronic hep C s/p treatment, Depression, chronic pancreatitis (w/ recurrent bouts most recently in 8/2017 at [facility]) presenting w/ c/o of epigastric abdominal pain sent from his PMD for  outside CT findings of acute pancreatitis.     Patient had complaints of burning 8/10 epigastric pain radiating to the back since 2 days PTA. He had this pain soon after eating breakfast ([place] toast), and it began to get progressively worse during they day. It was associated w/ nausea, loss of appetite, and two loose BM's. Pain was worse after eating. No complaints of CP, palpitations, fevers/chills, blood in stool or urine. No one else in the house is sick. No recent infections or use of abx. Patient went to his GI doctor, Dr. Ragland, who did blood work and CT imaging of his belly and told him to go the ED for management of pancreatitis. Had cholecystectomy in 2014. no alcohol use.     Of note, had EUS in 2014: demonstrated sonographic changes consistent with mild-moderate chronic pancreatitis,    Currently, pain has reduced to 3/10 after having pain medication, nausea has subsided.    ED vitals: 98.9, HR 90, /97-->158/98, 18, 100% RA  In the ED: given dilaudid 0.5mg x 2, nmorphine 4mg x 1, zofran x 1

## 2017-10-13 NOTE — PROGRESS NOTE ADULT - PROBLEM SELECTOR PLAN 1
-clinical symptoms of epigastric pain radiating to back w/ clinical exam, and significant history of acute pancreatitis, concerning for acute on chronic pancreatitis, no etoh use, no recent trauma or procedure  -other differential includes: NSAID induced gastritis, peptic ulcer disease  -BISAP score: 0  -lipase normal  -CT abdomen uploaded and reviewed by GI, not concerning for acute pancreatitis  -IVF NS at 75 cc/hr, patient not overloaded on exam  -will advance to clear liquid diet  -pain control w/ morphine 2 mg q 4 prn  -electrolyte replenishment as needed  -lipid profile wnl  -cardiology recs: holding losartan as it can cause drug-induced pancreatitis

## 2017-10-13 NOTE — H&P ADULT - PROBLEM SELECTOR PLAN 1
-clinical symptoms of epigastric pain radiating to back w/ clinical exam, and significant history of acute pancreatitis, consistent w/ acute on chronic pancreatitis  -lipase 39, which can be normal in patient with chronic pancreatitis  -CT abdomen and imaging not in chart, will obtain imaging from GI team   -continue w/ aggressive fluid hydration, patient not overloaded on exam  -keep npo for now until able to tolerate  -pain control w/ morphine or dilaudid -clinical symptoms of epigastric pain radiating to back w/ clinical exam, and significant history of acute pancreatitis, consistent w/ acute on chronic pancreatitis, no etoh use, no recent trauma or procedure  - other differential includes: gastritis, gastroenteritis vs gerd, no CP-less suspicion for cardiac source  -lipase 39, which can be normal in patient with chronic pancreatitis,  -CT abdomen and imaging not in chart, will obtain imaging from GI team   -continue w/ aggressive fluid hydration, patient not overloaded on exam  -keep npo for now until able to tolerate  -pain control w/ morphine or dilaudid  -electrolyte replenishment as needed  -lipid profile to check TG -clinical symptoms of epigastric pain radiating to back w/ clinical exam, and significant history of acute pancreatitis, consistent w/ acute on chronic pancreatitis, no etoh use, no recent trauma or procedure  - other differential includes: gastritis, gastroenteritis vs gerd, no CP-less suspicion for cardiac source  -BISAP score: 0  -lipase 39, which can be normal in patient with chronic pancreatitis,  -CT abdomen and imaging not in chart, will obtain imaging from GI team   -continue w/ aggressive fluid hydration, patient not overloaded on exam  -keep npo for now until able to tolerate  -pain control w/ morphine or dilaudid  -electrolyte replenishment as needed  -lipid profile to check TG -clinical symptoms of epigastric pain radiating to back w/ clinical exam, and significant history of acute pancreatitis, consistent w/ acute on chronic pancreatitis, no etoh use, no recent trauma or procedure  - other differential includes: gastritis, gastroenteritis vs gerd, low suspicion for choledolithiasis as alk phos and lfts normal, no CP-less suspicion for cardiac source  -BISAP score: 0  -lipase 39, which can be normal in patient with chronic pancreatitis,  -CT abdomen and imaging not in chart, will obtain imaging from GI team   -continue w/ aggressive fluid hydration, patient not overloaded on exam  -keep npo for now until able to tolerate  -pain control w/ dilaudid 1mg q 4 prn  -electrolyte replenishment as needed  -lipid profile to check TG  -hold losartan as it can cause drug-induced pancreatitis -clinical symptoms of epigastric pain radiating to back w/ clinical exam, and significant history of acute pancreatitis, consistent w/ acute on chronic pancreatitis, no etoh use, no recent trauma or procedure  - other differential includes: gastritis, gastroenteritis vs gerd, low suspicion for choledocholithiasis as alk phos and lfts normal, no CP-less suspicion for cardiac source  -BISAP score: 0  -lipase 39, which can be normal in patient with chronic pancreatitis,  -CT abdomen and imaging not in chart, will obtain imaging from GI team   -continue w/ aggressive fluid hydration, patient not overloaded on exam  -keep npo for now until able to tolerate  -pain control w/ dilaudid 1mg q 4 prn  -electrolyte replenishment as needed  -lipid profile to check TG  -hold losartan as it can cause drug-induced pancreatitis

## 2017-10-13 NOTE — H&P ADULT - NSHPREVIEWOFSYSTEMS_GEN_ALL_CORE
REVIEW OF SYSTEMS:  CONSTITUTIONAL: No fever, weight loss, or fatigue  EYES: No eye pain, visual disturbances, or discharge  ENMT:  No difficulty hearing, tinnitus, vertigo; No sinus or throat pain  NECK: No pain or stiffness  BREASTS: No pain, masses, or nipple discharge  RESPIRATORY: No cough, wheezing, chills or hemoptysis; No shortness of breath  CARDIOVASCULAR: No chest pain, palpitations, dizziness, or leg swelling  GASTROINTESTINAL: No abdominal or epigastric pain. No nausea, vomiting, or hematemesis; No diarrhea or constipation. No melena or hematochezia.  GENITOURINARY: No dysuria, frequency, hematuria, or incontinence  NEUROLOGICAL: No headaches, memory loss, loss of strength, numbness, or tremors  SKIN: No itching, burning, rashes, or lesions   LYMPH NODES: No enlarged glands  ENDOCRINE: No heat or cold intolerance; No hair loss  MUSCULOSKELETAL: No joint pain or swelling; No muscle, back, or extremity pain  PSYCHIATRIC: No depression, anxiety, mood swings, or difficulty sleeping  HEME/LYMPH: No easy bruising, or bleeding gums  ALLERY AND IMMUNOLOGIC: No hives or eczema REVIEW OF SYSTEMS:  CONSTITUTIONAL: No fever, weight loss, or fatigue  EYES: No eye pain, visual disturbances, or discharge  ENMT:  No difficulty hearing, tinnitus, vertigo; No sinus or throat pain  RESPIRATORY: No cough, wheezing, chills or hemoptysis; No shortness of breath  CARDIOVASCULAR: No chest pain, palpitations, dizziness, or leg swelling  GASTROINTESTINAL: No hematemesis; No diarrhea or constipation. No melena or hematochezia.  GENITOURINARY: No dysuria, frequency, hematuria, or incontinence  NEUROLOGICAL: No headaches  SKIN: No itching, burning, rashes, or lesions   LYMPH NODES: No enlarged glands  MUSCULOSKELETAL: No joint pain or swelling; No muscle, back, or extremity pain  HEME/LYMPH: No easy bruising, or bleeding gums  ALLERY AND IMMUNOLOGIC: No hives or eczema

## 2017-10-13 NOTE — PROGRESS NOTE ADULT - SUBJECTIVE AND OBJECTIVE BOX
Patient is a 57y old  Male who presents with a chief complaint of abdominal pain (13 Oct 2017 00:02)    HPI: 57 y.o male w/ pmhx of HTN, CLEMENT on CPAP, chronic hep C s/p treatment, Depression, chronic pancreatitis (w/ recurrent bouts most recently in 8/2017 at [facility]) presenting w/ c/o of epigastric abdominal pain sent from his PMD for  outside CT findings of acute pancreatitis.     Patient had complaints of burning 8/10 epigastric pain radiating to the back since 2 days PTA. He had this pain soon after eating breakfast (Maori toast), and it began to get progressively worse during they day. It was associated w/ nausea, loss of appetite, and two loose BM's. Pain was worse after eating. No complaints of CP, palpitations, fevers/chills, blood in stool or urine. No one else in the house is sick. No recent infections or use of abx. Patient went to his GI doctor, Dr. Ragland, who did blood work and CT imaging of his belly and told him to go the ED for management of pancreatitis. Had cholecystectomy in 2014. no alcohol use.     Of note, had EUS in 2014: demonstrated sonographic changes consistent with mild-moderate chronic pancreatitis,    Currently, pain has reduced to 3/10 after having pain medication, nausea has subsided.    ED vitals: 98.9, HR 90, /97-->158/98, 18, 100% RA  In the ED: given dilaudid 0.5mg x 2, nmorphine 4mg x 1, zofran x 1    SUBJECTIVE / OVERNIGHT EVENTS: denies cp, sob. c/o of HA and abdominal pain, abdominal pain improved w/ leaning forward.     MEDICATIONS  (STANDING):  amLODIPine   Tablet 5 milliGRAM(s) Oral daily  buPROPion XL . 300 milliGRAM(s) Oral daily  FLUoxetine 40 milliGRAM(s) Oral daily  heparin  Injectable 5000 Unit(s) SubCutaneous every 8 hours  lamoTRIgine 100 milliGRAM(s) Oral daily  modafinil 100 milliGRAM(s) Oral daily  pantoprazole    Tablet 40 milliGRAM(s) Oral two times a day before meals  sodium chloride 0.9%. 1000 milliLiter(s) (75 mL/Hr) IV Continuous <Continuous>    MEDICATIONS  (PRN):  acetaminophen   Tablet. 975 milliGRAM(s) Oral every 6 hours PRN Moderate Pain (4 - 6)  morphine  - Injectable 2 milliGRAM(s) IV Push every 6 hours PRN Moderate Pain (4 - 6)      Vital Signs Last 24 Hrs  T(C): 36.8 (13 Oct 2017 12:42), Max: 37.2 (12 Oct 2017 16:05)  T(F): 98.2 (13 Oct 2017 12:42), Max: 98.9 (12 Oct 2017 16:05)  HR: 77 (13 Oct 2017 12:42) (73 - 91)  BP: 118/79 (13 Oct 2017 12:42) (104/71 - 165/97)  BP(mean): --  RR: 18 (13 Oct 2017 12:42) (18 - 19)  SpO2: 97% (13 Oct 2017 12:42) (95% - 100%)  CAPILLARY BLOOD GLUCOSE        I&O's Summary    12 Oct 2017 07:01  -  13 Oct 2017 07:00  --------------------------------------------------------  IN: 3160 mL / OUT: 0 mL / NET: 3160 mL    13 Oct 2017 07:01  -  13 Oct 2017 13:23  --------------------------------------------------------  IN: 1100 mL / OUT: 0 mL / NET: 1100 mL          PHYSICAL EXAM    GENERAL: NAD, well-developed  HEAD:  Atraumatic, Normocephalic  CHEST/LUNG: Clear to percussion bilaterally; No rales, rhonchi, wheezing, or rubs  HEART: Regular rate and rhythm; No murmurs, rubs, or gallops  ABDOMEN: Soft, moderate tenderness in epigastric area, Nondistended; no guarding, Bowel sounds present  EXTREMITIES:  2+ Peripheral Pulses, No clubbing, cyanosis, or edema  LYMPH: No lymphadenopathy noted  SKIN: No rashes or lesions  NERVOUS SYSTEM:  Alert & Oriented X3,     LABS:                        13.3   6.34  )-----------( 213      ( 13 Oct 2017 08:39 )             38.4     10-13    141  |  107  |  11  ----------------------------<  90  4.2   |  24  |  0.78    Ca    8.1<L>      13 Oct 2017 08:34  Phos  2.8     10-13  Mg     2.0     10-13    TPro  6.2  /  Alb  3.3  /  TBili  0.5  /  DBili  x   /  AST  20  /  ALT  9<L>  /  AlkPhos  64  10-13    PT/INR - ( 13 Oct 2017 08:37 )   PT: 11.6 sec;   INR: 1.03 ratio         PTT - ( 13 Oct 2017 08:37 )  PTT:27.7 sec      Urinalysis Basic - ( 12 Oct 2017 20:23 )    Color: PL Yellow / Appearance: Clear / SG: >1.030 / pH: x  Gluc: x / Ketone: Negative  / Bili: Negative / Urobili: Negative   Blood: x / Protein: Negative / Nitrite: Negative   Leuk Esterase: Negative / RBC: x / WBC x   Sq Epi: x / Non Sq Epi: x / Bacteria: x          RADIOLOGY & ADDITIONAL TESTS:    Imaging Personally Reviewed:  Consultant(s) Notes Reviewed:  Gastroenterology  Care Discussed with Consultants/Other Providers: Gastroenterology

## 2017-10-13 NOTE — PROGRESS NOTE ADULT - ATTENDING COMMENTS
Agree with R1/R2 resident plan of care  c/w HA,  upper abdominal pain-epigastric and left upper Quad, No rebound or guarding. He denies alcohol drinking, had chiolecystectomy in past  - Uploaded CT abdomen showed no acute pancreatitis. Lipase normal  -Spoke to GI- Dr Tran. Clears , PPI, analgesics for now. Would not give NSAIDS for possible NSAIDS gastropathy  - Off ARB for rare cause of pancreatitis. c/w Norvasc  - advance diet as tolerated  -f/u GI plan Agree with R1/R2 resident plan of care  c/w HA,  upper abdominal pain-epigastric and left upper Quad, No rebound or guarding. He denies alcohol drinking, had cholecystectomy in past  - Uploaded CT abdomen showed no acute pancreatitis. Lipase normal  -Spoke to GI- Dr Tran. Clears , PPI, analgesics for now. Would not give NSAIDS for possible NSAIDS gastropathy  - Off ARB for rare cause of pancreatitis. c/w Norvasc  - advance diet as tolerated  -f/u GI plan

## 2017-10-13 NOTE — PROGRESS NOTE ADULT - SUBJECTIVE AND OBJECTIVE BOX
Patient is a 57y old  Male who presents with a chief complaint of abdominal pain with outside CT findings of pancreatitis.    Interval events:  Patient given aggressive IV fluids for 24 hours.  Cozaar held as possible etiology for pancreatitis.  Statin already stopped by his PMD.    He still has some right sided pain but well controlled with pain medications.  I reviewed the imaging from his outside CT with radiology, which was uploaded. His pancreas  appears better than on his admission in 2014.  He has a low attenuation area in the pancreatic head which is chronic and likely represents focal fat.  The pancreas is atrophic but no acute fat stranding or any other evidence of acute pancreatitis. There is no necrosis or collections. No splenic vascular abnormalities. No bowel wall thickening.      PAST MEDICAL & SURGICAL HISTORY:  Liver disease: hepatitis c chronic  Cholelithiasis: s/p cholecystectomy 2014  Diverticulitis  Sleep apnea  Hyperlipemia  HTN (hypertension)  S/P cholecystectomy: 11/11/14 adn liver biopsy      MEDICATIONS  (STANDING):  sodium chloride 0.9%. 7000 milliLiter(s) (300 mL/Hr) IV Continuous <Continuous>    MEDICATIONS  (PRN):      Allergies    penicillins (Rash)    Intolerances        Review of Systems:    General:  No wt loss, fevers, chills, night sweats,fatigue,   CV:  No pain, palpitations, hypo/hypertension  Resp:  No dyspnea, cough, tachypnea, wheezing  GI:  See HPI  :  No pain, bleeding, incontinence, nocturia  Muscle:  No pain, weakness  Neuro:  No weakness, tingling, memory problems  Psych:  No fatigue, insomnia, mood problems, depression  Endocrine:  No polyuria, polydypsia, cold/heat intolerance  Heme:  No petechiae, ecchymosis, easy bruisability  Skin:  No rash, tattoos, scars, edema      Vital Signs Last 24 Hrs  T(C): 36.4 (13 Oct 2017 08:10), Max: 37.2 (12 Oct 2017 16:05)  T(F): 97.5 (13 Oct 2017 08:10), Max: 98.9 (12 Oct 2017 16:05)  HR: 73 (13 Oct 2017 09:09) (73 - 91)  BP: 104/71 (13 Oct 2017 08:10) (104/71 - 165/97)  BP(mean): --  RR: 18 (13 Oct 2017 08:10) (18 - 19)  SpO2: 97% (13 Oct 2017 09:09) (95% - 100%)    PHYSICAL EXAM:    Constitutional: NAD, well-developed  Neck: No LAD, supple  Respiratory: CTA and P  Cardiovascular: S1 and S2, RRR, no M  Gastrointestinal: BS+, soft, ND, neg HSM, left sided TTP  Extremities: No peripheral edema, neg clubbing, cyanosis  Vascular: 2+ peripheral pulses  Neurological: A/O x 3, no focal deficits  Psychiatric: Normal mood, normal affect  Skin: No rashes        LABS:                        13.3   6.34  )-----------( 213      ( 13 Oct 2017 08:39 )             38.4     10-13    141  |  107  |  11  ----------------------------<  90  4.2   |  24  |  0.78    Ca    8.1<L>      13 Oct 2017 08:34  Phos  2.8     10-13  Mg     2.0     10-13    TPro  6.2  /  Alb  3.3  /  TBili  0.5  /  DBili  x   /  AST  20  /  ALT  9<L>  /  AlkPhos  64  10-13    LIVER FUNCTIONS - ( 13 Oct 2017 08:34 )  Alb: 3.3 g/dL / Pro: 6.2 g/dL / ALK PHOS: 64 U/L / ALT: 9 U/L / AST: 20 U/L / GGT: x           PT/INR - ( 13 Oct 2017 08:37 )   PT: 11.6 sec;   INR: 1.03 ratio         PTT - ( 13 Oct 2017 08:37 )  PTT:27.7 sec  Urinalysis Basic - ( 12 Oct 2017 20:23 )    Color: PL Yellow / Appearance: Clear / SG: >1.030 / pH: x  Gluc: x / Ketone: Negative  / Bili: Negative / Urobili: Negative   Blood: x / Protein: Negative / Nitrite: Negative   Leuk Esterase: Negative / RBC: x / WBC x   Sq Epi: x / Non Sq Epi: x / Bacteria: x      Amylase Serum--      Lipase serum39       Ammonia--

## 2017-10-13 NOTE — PROGRESS NOTE ADULT - ASSESSMENT
57M presenting with abdominal pain and outside CT findings reportedly c/w pancreatitis.    I reviewed the imaging from his outside CT with radiology, which was uploaded. His pancreas  appears better than on his admission in 2014.  He has a low attenuation area in the pancreatic head which is chronic and likely represents focal fat.  The pancreas is atrophic but no acute fat stranding or any other evidence of acute pancreatitis. There is no necrosis or collections. No splenic vascular abnormalities. No bowel wall thickening.    BISAP 0  Patient has been adequately hydrated as evidenced by hemodilution.  Advance diet  Pain Control  Recent NSAID history, but pain is more to the left flank. Can start PPI oral BID empirically for possible NSAID gastritis.  Home medications reviewed - losartan held

## 2017-10-13 NOTE — H&P ADULT - NSHPSOCIALHISTORY_GEN_ALL_CORE
Social History:    Marital Status:  (X)    (   ) Single    (   )    (  )   Occupation:   Lives with: (  ) alone  (  ) children   (X) spouse   (  ) parents  (  ) other    Substance Use (street drugs): (X) never used  (  ) other:  Tobacco Usage:  (none) never smoked   ( ) former smoker   (   ) current smoker  (     ) pack year  (        ) last cigarette date  Alcohol Usage: none Social History:    Marital Status:  (X)    (   ) Single    (   )    (  )   Occupation: [employer] making  Lives with: (  ) alone  (  ) children   (X) spouse   (  ) parents  (  ) other    Substance Use (street drugs): (X) never used  (  ) other:  Tobacco Usage:  (none) never smoked   ( ) former smoker   (   ) current smoker  (     ) pack year  (        ) last cigarette date  Alcohol Usage: none

## 2017-10-13 NOTE — H&P ADULT - NSHPLABSRESULTS_GEN_ALL_CORE
Personally reviewed labs, imaging, ekg by me  10-12    137  |  100  |  15  ----------------------------<  109<H>  3.8   |  23  |  0.97    Ca    9.5      12 Oct 2017 17:23    TPro  8.0  /  Alb  4.5  /  TBili  0.5  /  DBili  x   /  AST  24  /  ALT  12  /  AlkPhos  83  10-12      PT/INR - ( 12 Oct 2017 17:23 )   PT: 11.5 sec;   INR: 1.05 ratio         PTT - ( 12 Oct 2017 17:23 )  PTT:28.8 sec              Urinalysis Basic - ( 12 Oct 2017 20:23 )    Color: PL Yellow / Appearance: Clear / SG: >1.030 / pH: x  Gluc: x / Ketone: Negative  / Bili: Negative / Urobili: Negative   Blood: x / Protein: Negative / Nitrite: Negative   Leuk Esterase: Negative / RBC: x / WBC x   Sq Epi: x / Non Sq Epi: x / Bacteria: x                              15.4   11.5  )-----------( 269      ( 12 Oct 2017 17:23 )             45.0     CAPILLARY BLOOD GLUCOSE        Blood Gas Source Venous: Venous (10-12 @ 17:23) Personally reviewed labs, imaging, ekg by me  10-12    137  |  100  |  15  ----------------------------<  109<H>  3.8   |  23  |  0.97    Ca    9.5      12 Oct 2017 17:23    TPro  8.0  /  Alb  4.5  /  TBili  0.5  /  DBili  x   /  AST  24  /  ALT  12  /  AlkPhos  83  10-12      PT/INR - ( 12 Oct 2017 17:23 )   PT: 11.5 sec;   INR: 1.05 ratio         PTT - ( 12 Oct 2017 17:23 )  PTT:28.8 sec              Urinalysis Basic - ( 12 Oct 2017 20:23 )    Color: PL Yellow / Appearance: Clear / SG: >1.030 / pH: x  Gluc: x / Ketone: Negative  / Bili: Negative / Urobili: Negative   Blood: x / Protein: Negative / Nitrite: Negative   Leuk Esterase: Negative / RBC: x / WBC x   Sq Epi: x / Non Sq Epi: x / Bacteria: x                              15.4   11.5  )-----------( 269      ( 12 Oct 2017 17:23 )             45.0     CAPILLARY BLOOD GLUCOSE        Blood Gas Source Venous: Venous (10-12 @ 17:23)    EKG ordered    Old records reviewed:  Venous duplex 2014: IMPRESSION:  No  duplex  evidence  of  DVT  in  the  left  upper  extremity.  There  is  superficial  thrombophlebitis  at  the  left  cephalic  vein  at  the  mid  forearm.    US abdomen 2014: IMPRESSION:  The  pancreas  was  obscured  by  overlying  bowel  gas.   Spleen  size  at  the  upper  limits  of  normal.  Trace  free  fluid  adjacent  to  the  spleen.  Status  post  cholecystectomy.    MRI abdomen 2014: IMPRESSION:  Inflammatory  changes  at  the  pancreatic  tail  compatible  with  pancreatitis.  There  is  no  associated  necrosis  or  abscess.  Not  significantly  changed  compared  to  11/25/2014.

## 2017-10-14 ENCOUNTER — TRANSCRIPTION ENCOUNTER (OUTPATIENT)
Age: 57
End: 2017-10-14

## 2017-10-14 DIAGNOSIS — K86.1 OTHER CHRONIC PANCREATITIS: ICD-10-CM

## 2017-10-14 LAB
ANION GAP SERPL CALC-SCNC: 15 MMOL/L — SIGNIFICANT CHANGE UP (ref 5–17)
BASOPHILS # BLD AUTO: 0.03 K/UL — SIGNIFICANT CHANGE UP (ref 0–0.2)
BASOPHILS NFR BLD AUTO: 0.5 % — SIGNIFICANT CHANGE UP (ref 0–2)
BUN SERPL-MCNC: 7 MG/DL — SIGNIFICANT CHANGE UP (ref 7–23)
CALCIUM SERPL-MCNC: 9.6 MG/DL — SIGNIFICANT CHANGE UP (ref 8.4–10.5)
CHLORIDE SERPL-SCNC: 106 MMOL/L — SIGNIFICANT CHANGE UP (ref 96–108)
CO2 SERPL-SCNC: 22 MMOL/L — SIGNIFICANT CHANGE UP (ref 22–31)
CREAT SERPL-MCNC: 1 MG/DL — SIGNIFICANT CHANGE UP (ref 0.5–1.3)
CULTURE RESULTS: NO GROWTH — SIGNIFICANT CHANGE UP
EOSINOPHIL # BLD AUTO: 0.27 K/UL — SIGNIFICANT CHANGE UP (ref 0–0.5)
EOSINOPHIL NFR BLD AUTO: 4.8 % — SIGNIFICANT CHANGE UP (ref 0–6)
GLUCOSE SERPL-MCNC: 91 MG/DL — SIGNIFICANT CHANGE UP (ref 70–99)
HCT VFR BLD CALC: 42 % — SIGNIFICANT CHANGE UP (ref 39–50)
HGB BLD-MCNC: 14.6 G/DL — SIGNIFICANT CHANGE UP (ref 13–17)
IMM GRANULOCYTES NFR BLD AUTO: 0.9 % — SIGNIFICANT CHANGE UP (ref 0–1.5)
LYMPHOCYTES # BLD AUTO: 1.64 K/UL — SIGNIFICANT CHANGE UP (ref 1–3.3)
LYMPHOCYTES # BLD AUTO: 29.2 % — SIGNIFICANT CHANGE UP (ref 13–44)
MAGNESIUM SERPL-MCNC: 2 MG/DL — SIGNIFICANT CHANGE UP (ref 1.6–2.6)
MCHC RBC-ENTMCNC: 30.1 PG — SIGNIFICANT CHANGE UP (ref 27–34)
MCHC RBC-ENTMCNC: 34.8 GM/DL — SIGNIFICANT CHANGE UP (ref 32–36)
MCV RBC AUTO: 86.6 FL — SIGNIFICANT CHANGE UP (ref 80–100)
MONOCYTES # BLD AUTO: 0.68 K/UL — SIGNIFICANT CHANGE UP (ref 0–0.9)
MONOCYTES NFR BLD AUTO: 12.1 % — SIGNIFICANT CHANGE UP (ref 2–14)
NEUTROPHILS # BLD AUTO: 2.95 K/UL — SIGNIFICANT CHANGE UP (ref 1.8–7.4)
NEUTROPHILS NFR BLD AUTO: 52.5 % — SIGNIFICANT CHANGE UP (ref 43–77)
PLATELET # BLD AUTO: 257 K/UL — SIGNIFICANT CHANGE UP (ref 150–400)
POTASSIUM SERPL-MCNC: 4.6 MMOL/L — SIGNIFICANT CHANGE UP (ref 3.5–5.3)
POTASSIUM SERPL-SCNC: 4.6 MMOL/L — SIGNIFICANT CHANGE UP (ref 3.5–5.3)
RBC # BLD: 4.85 M/UL — SIGNIFICANT CHANGE UP (ref 4.2–5.8)
RBC # FLD: 12.7 % — SIGNIFICANT CHANGE UP (ref 10.3–14.5)
SODIUM SERPL-SCNC: 143 MMOL/L — SIGNIFICANT CHANGE UP (ref 135–145)
SPECIMEN SOURCE: SIGNIFICANT CHANGE UP
WBC # BLD: 5.62 K/UL — SIGNIFICANT CHANGE UP (ref 3.8–10.5)
WBC # FLD AUTO: 5.62 K/UL — SIGNIFICANT CHANGE UP (ref 3.8–10.5)

## 2017-10-14 PROCEDURE — 99233 SBSQ HOSP IP/OBS HIGH 50: CPT | Mod: GC

## 2017-10-14 RX ORDER — ACETAMINOPHEN 500 MG
2 TABLET ORAL
Qty: 0 | Refills: 0 | COMMUNITY
Start: 2017-10-14

## 2017-10-14 RX ORDER — PANTOPRAZOLE SODIUM 20 MG/1
1 TABLET, DELAYED RELEASE ORAL
Qty: 60 | Refills: 0 | OUTPATIENT
Start: 2017-10-14 | End: 2017-11-13

## 2017-10-14 RX ORDER — HYDROMORPHONE HYDROCHLORIDE 2 MG/ML
2 INJECTION INTRAMUSCULAR; INTRAVENOUS; SUBCUTANEOUS EVERY 6 HOURS
Qty: 0 | Refills: 0 | Status: DISCONTINUED | OUTPATIENT
Start: 2017-10-14 | End: 2017-10-15

## 2017-10-14 RX ADMIN — HEPARIN SODIUM 5000 UNIT(S): 5000 INJECTION INTRAVENOUS; SUBCUTANEOUS at 22:14

## 2017-10-14 RX ADMIN — LAMOTRIGINE 100 MILLIGRAM(S): 25 TABLET, ORALLY DISINTEGRATING ORAL at 11:19

## 2017-10-14 RX ADMIN — Medication 40 MILLIGRAM(S): at 11:18

## 2017-10-14 RX ADMIN — HEPARIN SODIUM 5000 UNIT(S): 5000 INJECTION INTRAVENOUS; SUBCUTANEOUS at 05:36

## 2017-10-14 RX ADMIN — MODAFINIL 100 MILLIGRAM(S): 200 TABLET ORAL at 11:18

## 2017-10-14 RX ADMIN — PANTOPRAZOLE SODIUM 40 MILLIGRAM(S): 20 TABLET, DELAYED RELEASE ORAL at 05:43

## 2017-10-14 RX ADMIN — PANTOPRAZOLE SODIUM 40 MILLIGRAM(S): 20 TABLET, DELAYED RELEASE ORAL at 17:25

## 2017-10-14 RX ADMIN — BUPROPION HYDROCHLORIDE 300 MILLIGRAM(S): 150 TABLET, EXTENDED RELEASE ORAL at 11:18

## 2017-10-14 RX ADMIN — HEPARIN SODIUM 5000 UNIT(S): 5000 INJECTION INTRAVENOUS; SUBCUTANEOUS at 13:36

## 2017-10-14 RX ADMIN — AMLODIPINE BESYLATE 5 MILLIGRAM(S): 2.5 TABLET ORAL at 05:37

## 2017-10-14 NOTE — PROGRESS NOTE ADULT - SUBJECTIVE AND OBJECTIVE BOX
HPI:  58 yo male HTN, chronic pancreatitis admitted with acute on chronic pancreatitis, confirmed on CT scan.  Pt denies cp or sob.  Feeling better tolerating po  PAST MEDICAL & SURGICAL HISTORY:  Chronic pancreatitis  Liver disease: hepatitis c chronic  Cholelithiasis: s/p cholecystectomy 2014  Diverticulitis  Sleep apnea  Hyperlipemia  HTN (hypertension)  S/P cholecystectomy: 11/11/14 adn liver biopsy      Allergies    penicillins (Rash)    Intolerances          MEDICATIONS  (STANDING):  amLODIPine   Tablet 5 milliGRAM(s) Oral daily  buPROPion XL . 300 milliGRAM(s) Oral daily  FLUoxetine 40 milliGRAM(s) Oral daily  heparin  Injectable 5000 Unit(s) SubCutaneous every 8 hours  lamoTRIgine 100 milliGRAM(s) Oral daily  modafinil 100 milliGRAM(s) Oral daily  pantoprazole    Tablet 40 milliGRAM(s) Oral two times a day before meals  sodium chloride 0.9%. 1000 milliLiter(s) (75 mL/Hr) IV Continuous <Continuous>    MEDICATIONS  (PRN):  acetaminophen   Tablet. 650 milliGRAM(s) Oral every 6 hours PRN Mild Pain (1 - 3)  HYDROmorphone   Tablet 2 milliGRAM(s) Oral every 6 hours PRN moderate and severe pain            Vital Signs Last 24 Hrs  T(C): 37.3 (14 Oct 2017 13:56), Max: 37.3 (14 Oct 2017 13:56)  T(F): 99.1 (14 Oct 2017 13:56), Max: 99.1 (14 Oct 2017 13:56)  HR: 87 (14 Oct 2017 13:56) (73 - 87)  BP: 138/87 (14 Oct 2017 13:56) (122/81 - 138/87)  BP(mean): --  RR: 18 (14 Oct 2017 13:56) (18 - 18)  SpO2: 95% (14 Oct 2017 13:56) (95% - 97%)  Daily     Daily   I&O's Detail    13 Oct 2017 07:01  -  14 Oct 2017 07:00  --------------------------------------------------------  IN:    Oral Fluid: 480 mL    sodium chloride 0.9%: 1100 mL  Total IN: 1580 mL    OUT:    Voided: 800 mL  Total OUT: 800 mL    Total NET: 780 mL      14 Oct 2017 07:01  -  14 Oct 2017 14:29  --------------------------------------------------------  IN:    Oral Fluid: 480 mL  Total IN: 480 mL    OUT:  Total OUT: 0 mL    Total NET: 480 mL          Physical Exam  Neck without JVD  Lungs clear  Cor s1s2 without m,r,g  Abd soft  Ext without edema  Pulses +2 DP  LABS  PT/INR - ( 13 Oct 2017 08:37 )   PT: 11.6 sec;   INR: 1.03 ratio         PTT - ( 13 Oct 2017 08:37 )  PTT:27.7 sec  Urinalysis Basic - ( 12 Oct 2017 20:23 )    Color: PL Yellow / Appearance: Clear / SG: >1.030 / pH: x  Gluc: x / Ketone: Negative  / Bili: Negative / Urobili: Negative   Blood: x / Protein: Negative / Nitrite: Negative   Leuk Esterase: Negative / RBC: x / WBC x   Sq Epi: x / Non Sq Epi: x / Bacteria: x          CBC Full  -  ( 14 Oct 2017 08:19 )  WBC Count : 5.62 K/uL  Hemoglobin : 14.6 g/dL  Hematocrit : 42.0 %  Platelet Count - Automated : 257 K/uL  Mean Cell Volume : 86.6 fl  Mean Cell Hemoglobin : 30.1 pg  Mean Cell Hemoglobin Concentration : 34.8 gm/dL  Auto Neutrophil # : 2.95 K/uL  Auto Lymphocyte # : 1.64 K/uL  Auto Monocyte # : 0.68 K/uL  Auto Eosinophil # : 0.27 K/uL  Auto Basophil # : 0.03 K/uL  Auto Neutrophil % : 52.5 %  Auto Lymphocyte % : 29.2 %  Auto Monocyte % : 12.1 %  Auto Eosinophil % : 4.8 %  Auto Basophil % : 0.5 %    10-14    143  |  106  |  7   ----------------------------<  91  4.6   |  22  |  1.00    Ca    9.6      14 Oct 2017 08:38  Phos  2.8     10-13  Mg     2.0     10-14    TPro  6.2  /  Alb  3.3  /  TBili  0.5  /  DBili  x   /  AST  20  /  ALT  9<L>  /  AlkPhos  64  10-13          Assessment and Plan:  58 yo male HTN, chronic pancreatitis admitted with acute on chronic pancreatitis, confirmed on CT scan.  Pt denies cp or sob.  Feeling better tolerating po    Doing well  Losartan dc'd   Continue amlodipine

## 2017-10-14 NOTE — DISCHARGE NOTE ADULT - MEDICATION SUMMARY - MEDICATIONS TO STOP TAKING
I will STOP taking the medications listed below when I get home from the hospital:    Cozaar 100 mg oral tablet  -- 1 tab(s) by mouth once a day

## 2017-10-14 NOTE — PROVIDER CONTACT NOTE (MEDICATION) - ASSESSMENT
Pain is 5/10. Patient reports  no pain relief with morphine. x 1 dose of diludid  1 mg given with good relief.

## 2017-10-14 NOTE — DISCHARGE NOTE ADULT - CARE PROVIDER_API CALL
Fede Ragland), Gastroenterology; Internal Medicine  [address] [place] [address]  Phone: [phone]  Fax: [phone]    Neto Fisher (DO), EndocrinologyMetabDiabetes; Internal Medicine  [address] [address]  Phone: [phone]  Fax: [phone]

## 2017-10-14 NOTE — PROGRESS NOTE ADULT - SUBJECTIVE AND OBJECTIVE BOX
Patient is a 57y old  Male who presents with a chief complaint of abdominal pain (13 Oct 2017 00:02)        SUBJECTIVE / OVERNIGHT EVENTS:      MEDICATIONS  (STANDING):  amLODIPine   Tablet 5 milliGRAM(s) Oral daily  buPROPion XL . 300 milliGRAM(s) Oral daily  FLUoxetine 40 milliGRAM(s) Oral daily  heparin  Injectable 5000 Unit(s) SubCutaneous every 8 hours  lamoTRIgine 100 milliGRAM(s) Oral daily  modafinil 100 milliGRAM(s) Oral daily  pantoprazole    Tablet 40 milliGRAM(s) Oral two times a day before meals  sodium chloride 0.9%. 1000 milliLiter(s) (75 mL/Hr) IV Continuous <Continuous>    MEDICATIONS  (PRN):  acetaminophen   Tablet. 650 milliGRAM(s) Oral every 6 hours PRN Mild Pain (1 - 3)  morphine  - Injectable 2 milliGRAM(s) IV Push every 4 hours PRN moderate and severe pain      Vital Signs Last 24 Hrs  T(C): 36.9 (14 Oct 2017 05:29), Max: 36.9 (14 Oct 2017 05:29)  T(F): 98.4 (14 Oct 2017 05:29), Max: 98.4 (14 Oct 2017 05:29)  HR: 77 (14 Oct 2017 05:29) (73 - 77)  BP: 124/79 (14 Oct 2017 05:29) (118/79 - 124/79)  BP(mean): --  RR: 18 (14 Oct 2017 05:29) (18 - 18)  SpO2: 97% (14 Oct 2017 05:29) (96% - 97%)  CAPILLARY BLOOD GLUCOSE        I&O's Summary    13 Oct 2017 07:01  -  14 Oct 2017 07:00  --------------------------------------------------------  IN: 1580 mL / OUT: 800 mL / NET: 780 mL          PHYSICAL EXAM  GENERAL: NAD, well-developed  HEAD:  Atraumatic, Normocephalic  EYES: EOMI, PERRLA, conjunctiva and sclera clear  NECK: Supple, No JVD  CHEST/LUNG: Clear to auscultation bilaterally; No wheeze  HEART: Regular rate and rhythm; No murmurs, rubs, or gallops  ABDOMEN: Soft, Nontender, Nondistended; Bowel sounds present  EXTREMITIES:  2+ Peripheral Pulses, No clubbing, cyanosis, or edema  PSYCH: AAOx3  SKIN: No rashes or lesions    LABS:                        14.6   5.62  )-----------( 257      ( 14 Oct 2017 08:19 )             42.0     10-14    143  |  106  |  7   ----------------------------<  91  4.6   |  22  |  1.00    Ca    9.6      14 Oct 2017 08:38  Phos  2.8     10-13  Mg     2.0     10-14    TPro  6.2  /  Alb  3.3  /  TBili  0.5  /  DBili  x   /  AST  20  /  ALT  9<L>  /  AlkPhos  64  10-13    PT/INR - ( 13 Oct 2017 08:37 )   PT: 11.6 sec;   INR: 1.03 ratio         PTT - ( 13 Oct 2017 08:37 )  PTT:27.7 sec      Urinalysis Basic - ( 12 Oct 2017 20:23 )    Color: PL Yellow / Appearance: Clear / SG: >1.030 / pH: x  Gluc: x / Ketone: Negative  / Bili: Negative / Urobili: Negative   Blood: x / Protein: Negative / Nitrite: Negative   Leuk Esterase: Negative / RBC: x / WBC x   Sq Epi: x / Non Sq Epi: x / Bacteria: x          RADIOLOGY & ADDITIONAL TESTS:    Imaging Personally Reviewed:  Consultant(s) Notes Reviewed:    Care Discussed with Consultants/Other Providers:  \ Patient is a 57y old  Male who presents with a chief complaint of abdominal pain (13 Oct 2017 00:02)    SUBJECTIVE / OVERNIGHT EVENTS: denies cp, sob, n/v. started on clear liquid diet yesterday, having abdominal pain with clears. still c/o HA, tylenol given helps.    MEDICATIONS  (STANDING):  amLODIPine   Tablet 5 milliGRAM(s) Oral daily  buPROPion XL . 300 milliGRAM(s) Oral daily  FLUoxetine 40 milliGRAM(s) Oral daily  heparin  Injectable 5000 Unit(s) SubCutaneous every 8 hours  lamoTRIgine 100 milliGRAM(s) Oral daily  modafinil 100 milliGRAM(s) Oral daily  pantoprazole    Tablet 40 milliGRAM(s) Oral two times a day before meals  sodium chloride 0.9%. 1000 milliLiter(s) (75 mL/Hr) IV Continuous <Continuous>    MEDICATIONS  (PRN):  acetaminophen   Tablet. 650 milliGRAM(s) Oral every 6 hours PRN Mild Pain (1 - 3)  morphine  - Injectable 2 milliGRAM(s) IV Push every 4 hours PRN moderate and severe pain      Vital Signs Last 24 Hrs  T(C): 36.9 (14 Oct 2017 05:29), Max: 36.9 (14 Oct 2017 05:29)  T(F): 98.4 (14 Oct 2017 05:29), Max: 98.4 (14 Oct 2017 05:29)  HR: 77 (14 Oct 2017 05:29) (73 - 77)  BP: 124/79 (14 Oct 2017 05:29) (118/79 - 124/79)  BP(mean): --  RR: 18 (14 Oct 2017 05:29) (18 - 18)  SpO2: 97% (14 Oct 2017 05:29) (96% - 97%)  CAPILLARY BLOOD GLUCOSE    I&O's Summary    13 Oct 2017 07:01  -  14 Oct 2017 07:00  --------------------------------------------------------  IN: 1580 mL / OUT: 800 mL / NET: 780 mL    PHYSICAL EXAM    GENERAL: NAD, well-developed  HEAD:  Atraumatic, Normocephalic  CHEST/LUNG: Clear to percussion bilaterally; No rales, rhonchi, wheezing, or rubs  HEART: Regular rate and rhythm; No murmurs, rubs, or gallops  ABDOMEN: Soft, moderate tenderness in epigastric area, Nondistended; no guarding, Bowel sounds present  EXTREMITIES:  2+ Peripheral Pulses, No clubbing, cyanosis, or edema  LYMPH: No lymphadenopathy noted  SKIN: No rashes or lesions  NERVOUS SYSTEM:  Alert & Oriented X3    LABS:                        14.6   5.62  )-----------( 257      ( 14 Oct 2017 08:19 )             42.0     10-14    143  |  106  |  7   ----------------------------<  91  4.6   |  22  |  1.00    Ca    9.6      14 Oct 2017 08:38  Phos  2.8     10-13  Mg     2.0     10-14    TPro  6.2  /  Alb  3.3  /  TBili  0.5  /  DBili  x   /  AST  20  /  ALT  9<L>  /  AlkPhos  64  10-13    PT/INR - ( 13 Oct 2017 08:37 )   PT: 11.6 sec;   INR: 1.03 ratio         PTT - ( 13 Oct 2017 08:37 )  PTT:27.7 sec      Urinalysis Basic - ( 12 Oct 2017 20:23 )    Color: PL Yellow / Appearance: Clear / SG: >1.030 / pH: x  Gluc: x / Ketone: Negative  / Bili: Negative / Urobili: Negative   Blood: x / Protein: Negative / Nitrite: Negative   Leuk Esterase: Negative / RBC: x / WBC x   Sq Epi: x / Non Sq Epi: x / Bacteria: x          RADIOLOGY & ADDITIONAL TESTS:        Imaging Personally Reviewed:  Consultant(s) Notes Reviewed:    Care Discussed with Consultants/Other Providers:  \

## 2017-10-14 NOTE — DISCHARGE NOTE ADULT - CARE PLAN
Principal Discharge DX:	Chronic pancreatitis  Goal:	Improvement of symptoms  Instructions for follow-up, activity and diet:	You have a history of chronic pancreatitis. We were initially concerned that you might have an acute on chronic pancreatitis. We had  Secondary Diagnosis:	Essential hypertension  Goal:	Improvement of blood pressures Principal Discharge DX:	Chronic pancreatitis  Goal:	Improvement of symptoms  Instructions for follow-up, activity and diet:	You have a history of chronic pancreatitis. We were initially concerned that you might have an acute on chronic pancreatitis. We had given you lots of intravenous fluids, and held your blood pressure medication losartan as it is a rare cause of acute pancreatitis. We looked at your CT scan that you had at an outside hospital and had our GI doctors look at the scan as well as examine you. They determined that there was no acute pancreatitis, only chronic inflammation. We advanced your diet from clear liquids to low fat diet. Please continue to adhere to a low fat diet.  Secondary Diagnosis:	Essential hypertension  Goal:	Improvement of blood pressures  Instructions for follow-up, activity and diet:	You have a history of high blood pressure. We had held your losartan while you were in the hospital.  Secondary Diagnosis:	Gastritis  Goal:	Improvement of abdominal pain  Instructions for follow-up, activity and diet:	You had come to the hospital because you were having abdominal pain. We found that you did not have acute pancreatitis, which is what we were initially concerned about. It is possible that the pain is coming from your stomach. Therefore we placed you on pantoprazole. Please continue to take the pantoprazole as directed.

## 2017-10-14 NOTE — DISCHARGE NOTE ADULT - INSTRUCTIONS
please eat a low fat diet, due to your history of pancreatitis. Please eat a low fat diet, due to your history of pancreatitis.

## 2017-10-14 NOTE — DISCHARGE NOTE ADULT - MEDICATION SUMMARY - MEDICATIONS TO TAKE
I will START or STAY ON the medications listed below when I get home from the hospital:    acetaminophen 325 mg oral tablet  -- 2 tab(s) by mouth every 6 hours, As needed, Mild Pain (1 - 3)  -- Indication: For Pain    LaMICtal 100 mg oral tablet  --  by mouth once a day (in the evening)  -- Indication: For Depression    FLUoxetine 40 mg oral capsule  --  by mouth once a day (in the evening)  -- Indication: For Depression    Rexulti  -- 1mg once a day  -- Indication: For Depression    amLODIPine 2.5 mg oral tablet  -- 1 tab(s) by mouth once a day  -- Indication: For HTN    Provigil 100 mg oral tablet  -- 1 tab(s) by mouth once a day (in the morning)  -- Indication: For Depression    pantoprazole 40 mg oral delayed release tablet  -- 1 tab(s) by mouth 2 times a day (before meals)  -- Indication: For Gastritis    Wellbutrin 300 mg/24 hours oral tablet, extended release  -- 1 tab(s) by mouth every 24 hours  -- Indication: For Depression

## 2017-10-14 NOTE — DISCHARGE NOTE ADULT - PLAN OF CARE
Improvement of symptoms You have a history of chronic pancreatitis. We were initially concerned that you might have an acute on chronic pancreatitis. We had Improvement of blood pressures You have a history of chronic pancreatitis. We were initially concerned that you might have an acute on chronic pancreatitis. We had given you lots of intravenous fluids, and held your blood pressure medication losartan as it is a rare cause of acute pancreatitis. We looked at your CT scan that you had at an outside hospital and had our GI doctors look at the scan as well as examine you. They determined that there was no acute pancreatitis, only chronic inflammation. We advanced your diet from clear liquids to low fat diet. Please continue to adhere to a low fat diet. You have a history of high blood pressure. We had held your losartan while you were in the hospital. Improvement of abdominal pain You had come to the hospital because you were having abdominal pain. We found that you did not have acute pancreatitis, which is what we were initially concerned about. It is possible that the pain is coming from your stomach. Therefore we placed you on pantoprazole. Please continue to take the pantoprazole as directed. You have a history of chronic pancreatitis. We were initially concerned that you might have an acute on chronic pancreatitis. We had given you lots of intravenous fluids, and held your blood pressure medication LOSARTAN as it is a rare cause of acute pancreatitis. We looked at your CT scan that you had at an outside hospital and had our GI doctors look at the scan as well as examine you. They determined that there was no acute pancreatitis, only chronic inflammation. We advanced your diet from clear liquids to low fat diet. Please continue to adhere to a low fat diet.

## 2017-10-14 NOTE — DISCHARGE NOTE ADULT - PATIENT PORTAL LINK FT
“You can access the [facility] Patient Portal, offered by [facility], by registering with the following website: [email or web address]”

## 2017-10-14 NOTE — PROGRESS NOTE ADULT - SUBJECTIVE AND OBJECTIVE BOX
Interval events:  Pain improved.  No complaints today.  No fever/No chills. No abdominal pain. Normal BM.    PAST MEDICAL & SURGICAL HISTORY:  Liver disease: hepatitis c chronic  Cholelithiasis: s/p cholecystectomy 2014  Diverticulitis  Sleep apnea  Hyperlipemia  HTN (hypertension)  S/P cholecystectomy: 11/11/14 adn liver biopsy      MEDICATIONS  (STANDING):  sodium chloride 0.9%. 7000 milliLiter(s) (300 mL/Hr) IV Continuous <Continuous>    MEDICATIONS  (PRN):      Allergies    penicillins (Rash)    Intolerances        Review of Systems:    General:  No wt loss, fevers, chills, night sweats,fatigue,   CV:  No pain, palpitations, hypo/hypertension  Resp:  No dyspnea, cough, tachypnea, wheezing  GI:  See HPI  :  No pain, bleeding, incontinence, nocturia  Muscle:  No pain, weakness  Neuro:  No weakness, tingling, memory problems  Psych:  No fatigue, insomnia, mood problems, depression  Endocrine:  No polyuria, polydypsia, cold/heat intolerance  Heme:  No petechiae, ecchymosis, easy bruisability  Skin:  No rash, tattoos, scars, edema      Vital Signs Last 24 Hrs  T(C): 36.9 (14 Oct 2017 05:29), Max: 36.9 (14 Oct 2017 05:29)  T(F): 98.4 (14 Oct 2017 05:29), Max: 98.4 (14 Oct 2017 05:29)  HR: 77 (14 Oct 2017 05:29) (73 - 77)  BP: 124/79 (14 Oct 2017 05:29) (118/79 - 124/79)  BP(mean): --  RR: 18 (14 Oct 2017 05:29) (18 - 18)  SpO2: 97% (14 Oct 2017 05:29) (96% - 97%)    PHYSICAL EXAM:    Constitutional: NAD, well-developed  Neck: No LAD, supple  Respiratory: CTA and P  Cardiovascular: S1 and S2, RRR, no M  Gastrointestinal: BS+, soft, ND, neg HSM, non tender  Extremities: No peripheral edema, neg clubbing, cyanosis  Vascular: 2+ peripheral pulses  Neurological: A/O x 3, no focal deficits  Psychiatric: Normal mood, normal affect  Skin: No rashes        LABS:                        14.6   5.62  )-----------( 257      ( 14 Oct 2017 08:19 )             42.0     10-14    143  |  106  |  7   ----------------------------<  91  4.6   |  22  |  1.00    Ca    9.6      14 Oct 2017 08:38  Phos  2.8     10-13  Mg     2.0     10-14    TPro  6.2  /  Alb  3.3  /  TBili  0.5  /  DBili  x   /  AST  20  /  ALT  9<L>  /  AlkPhos  64  10-13    LIVER FUNCTIONS - ( 13 Oct 2017 08:34 )  Alb: 3.3 g/dL / Pro: 6.2 g/dL / ALK PHOS: 64 U/L / ALT: 9 U/L / AST: 20 U/L / GGT: x           PT/INR - ( 13 Oct 2017 08:37 )   PT: 11.6 sec;   INR: 1.03 ratio         PTT - ( 13 Oct 2017 08:37 )  PTT:27.7 sec  Urinalysis Basic - ( 12 Oct 2017 20:23 )    Color: PL Yellow / Appearance: Clear / SG: >1.030 / pH: x  Gluc: x / Ketone: Negative  / Bili: Negative / Urobili: Negative   Blood: x / Protein: Negative / Nitrite: Negative   Leuk Esterase: Negative / RBC: x / WBC x   Sq Epi: x / Non Sq Epi: x / Bacteria: x

## 2017-10-14 NOTE — PROGRESS NOTE ADULT - ASSESSMENT
57 y.o male w/ pmhx of HTN, CLEMENT on CPAP, chronic hep C s/p treatment, Depression, chronic pancreatitis (w/ recurrent bouts most recently in 8/2017 at [facility]) presenting w/ c/o of epigastric abdominal pain sent from his PMD  CT findings and blood work consistent w/ acute pancreatitis.

## 2017-10-14 NOTE — DISCHARGE NOTE ADULT - HOSPITAL COURSE
History of Present Illness:    57 y.o male w/ pmhx of HTN, CLEMENT on CPAP, chronic hep C s/p treatment, Depression, chronic pancreatitis (w/ recurrent bouts most recently in 8/2017 at [facility]) presenting w/ c/o of epigastric abdominal pain sent from his PMD for  outside CT findings of acute pancreatitis.     Patient had complaints of burning 8/10 epigastric pain radiating to the back since 2 days PTA. He had this pain soon after eating breakfast ([place] toast), and it began to get progressively worse during they day. It was associated w/ nausea, loss of appetite, and two loose BM's. Pain was worse after eating. No complaints of CP, palpitations, fevers/chills, blood in stool or urine. No one else in the house is sick. No recent infections or use of abx. Patient went to his GI doctor, Dr. Ragland, who did blood work and CT imaging of his belly and told him to go the ED for management of pancreatitis. Had cholecystectomy in 2014. no alcohol use.     Of note, had EUS in 2014: demonstrated sonographic changes consistent with mild-moderate chronic pancreatitis,    ED Course:    ED vitals: 98.9, HR 90, /97-->158/98, 18, 100% RA  In the ED: given dilaudid 0.5mg x 2, nmorphine 4mg x 1, zofran x 1    Hospital Course:    Patient was admitted to the medicine service. Patient was kept NPO and given aggressive IV fluid hydration. Pain control was with dilaudid. Lipase levels sent were normal. Losartan was held as it is a rare cause of pancreatitis. Home antidepressants were continued. Cardiology was consulted and also recommended holding losartan. Gastroenterology was consulted, and in their opinion there was no acute pancreatitis. Patient was started on pantoprazole twice daily for prophylaxis. History of Present Illness:    57 y.o male w/ pmhx of HTN, CLEMENT on CPAP, chronic hep C s/p treatment, Depression, chronic pancreatitis (w/ recurrent bouts most recently in 8/2017 at [facility]) presenting w/ c/o of epigastric abdominal pain sent from his PMD for  outside CT findings of acute pancreatitis.     Patient had complaints of burning 8/10 epigastric pain radiating to the back since 2 days PTA. He had this pain soon after eating breakfast ([place] toast), and it began to get progressively worse during they day. It was associated w/ nausea, loss of appetite, and two loose BM's. Pain was worse after eating. No complaints of CP, palpitations, fevers/chills, blood in stool or urine. No one else in the house is sick. No recent infections or use of abx. Patient went to his GI doctor, Dr. Ragland, who did blood work and CT imaging of his belly and told him to go the ED for management of pancreatitis. Had cholecystectomy in 2014. no alcohol use.     Of note, had EUS in 2014: demonstrated sonographic changes consistent with mild-moderate chronic pancreatitis,    ED Course:    ED vitals: 98.9, HR 90, /97-->158/98, 18, 100% RA  In the ED: given dilaudid 0.5mg x 2, nmorphine 4mg x 1, zofran x 1    Hospital Course:    Patient was admitted to the medicine service. Patient was kept NPO and given aggressive IV fluid hydration. Pain control was with dilaudid. Lipase levels sent were normal. Losartan was held as it is a rare cause of pancreatitis. Home antidepressants were continued. Cardiology was consulted and also recommended holding losartan. Gastroenterology was consulted, and in their opinion there was no acute pancreatitis. Patient was started on pantoprazole twice daily for prophylaxis. Patient was advanced from NPO to clear liquid diet and tolerated well, and therefore was advanced to a low fat regular diet, which patient also tolerated well. Patient was stabilized and subsequently discharged with GI follow-up with Dr. Ragland. History of Present Illness:    57 y.o male w/ pmhx of HTN, CLEMENT on CPAP, chronic hep C s/p treatment, Depression, chronic pancreatitis (w/ recurrent bouts most recently in 8/2017 at [facility]) presenting w/ c/o of epigastric abdominal pain sent from his PMD for  outside CT findings of acute pancreatitis.     Patient had complaints of burning 8/10 epigastric pain radiating to the back since 2 days PTA. He had this pain soon after eating breakfast ([place] toast), and it began to get progressively worse during they day. It was associated w/ nausea, loss of appetite, and two loose BM's. Pain was worse after eating. No complaints of CP, palpitations, fevers/chills, blood in stool or urine. No one else in the house is sick. No recent infections or use of abx. Patient went to his GI doctor, Dr. Ragland, who did blood work and CT imaging of his belly and told him to go the ED for management of pancreatitis. Had cholecystectomy in 2014. no alcohol use.     Of note, had EUS in 2014: demonstrated sonographic changes consistent with mild-moderate chronic pancreatitis,    ED Course:    ED vitals: 98.9, HR 90, /97-->158/98, 18, 100% RA  In the ED: given dilaudid 0.5mg x 2, nmorphine 4mg x 1, zofran x 1    Hospital Course:    Patient was admitted to the medicine service. Patient was kept NPO and given aggressive IV fluid hydration. Pain control was with dilaudid. Lipase levels sent were normal. Losartan was held as it is a rare cause of pancreatitis. Home antidepressants were continued. Cardiology was consulted and also recommended holding losartan. Gastroenterology was consulted, and in their opinion there was no acute pancreatitis. Patient was started on pantoprazole twice daily for prophylaxis. Patient was advanced from NPO to clear liquid diet and tolerated well, and therefore was advanced to a low fat regular diet, which patient also tolerated well. Patient was stabilized and subsequently discharged with GI follow-up with Dr. Ragland.  35 minutes spent coordinating patient's D/C

## 2017-10-14 NOTE — PROGRESS NOTE ADULT - PROBLEM SELECTOR PLAN 1
-clinical symptoms of epigastric pain radiating to back w/ clinical exam, and significant history of acute pancreatitis, concerning for acute on chronic pancreatitis, no etoh use, no recent trauma or procedure  -other differential includes: NSAID induced gastritis, peptic ulcer disease  -BISAP score: 0  -lipase normal  -CT abdomen uploaded and reviewed by GI, not concerning for acute pancreatitis  -IVF NS at 75 cc/hr, patient not overloaded on exam  -will advance to clear liquid diet  -pain control w/ morphine 2 mg q 4 prn  -electrolyte replenishment as needed  -lipid profile wnl  -cardiology recs: holding losartan as it can cause drug-induced pancreatitis -clinical symptoms of epigastric pain radiating to back, per GI likely chronic pancreatitis, no etoh use, no recent trauma or procedure  -other differential includes: NSAID induced gastritis, peptic ulcer disease  -BISAP score: 0  -lipase normal  -CT abdomen uploaded and reviewed by GI, not concerning for acute pancreatitis  -IVF discontinued  -c/w clear liquid diet for now. Unable to advance today due to abdominal pain  -pain control w/ dilaudid 0.5 mg q 4 prn  -electrolyte replenishment as needed  -lipid profile wnl  -cardiology recs: holding losartan as it can cause drug-induced pancreatitis  -placed on PPI BID

## 2017-10-14 NOTE — PROGRESS NOTE ADULT - ASSESSMENT
57M presenting with abdominal pain  ?chronic pancreatitis vs gastritis.  Pain currently improved.    Continue PPI PO BID  Low fat diet as tolerated.  No GI contraindication to discharge,  Patient can follow with Dr Ragland as O/P

## 2017-10-15 VITALS
SYSTOLIC BLOOD PRESSURE: 118 MMHG | HEART RATE: 84 BPM | RESPIRATION RATE: 18 BRPM | OXYGEN SATURATION: 96 % | DIASTOLIC BLOOD PRESSURE: 81 MMHG | TEMPERATURE: 99 F

## 2017-10-15 DIAGNOSIS — R10.9 UNSPECIFIED ABDOMINAL PAIN: ICD-10-CM

## 2017-10-15 LAB
ANION GAP SERPL CALC-SCNC: 14 MMOL/L — SIGNIFICANT CHANGE UP (ref 5–17)
BUN SERPL-MCNC: 14 MG/DL — SIGNIFICANT CHANGE UP (ref 7–23)
CALCIUM SERPL-MCNC: 9.8 MG/DL — SIGNIFICANT CHANGE UP (ref 8.4–10.5)
CHLORIDE SERPL-SCNC: 102 MMOL/L — SIGNIFICANT CHANGE UP (ref 96–108)
CO2 SERPL-SCNC: 23 MMOL/L — SIGNIFICANT CHANGE UP (ref 22–31)
CREAT SERPL-MCNC: 1.19 MG/DL — SIGNIFICANT CHANGE UP (ref 0.5–1.3)
GLUCOSE SERPL-MCNC: 119 MG/DL — HIGH (ref 70–99)
HCT VFR BLD CALC: 42.5 % — SIGNIFICANT CHANGE UP (ref 39–50)
HGB BLD-MCNC: 14.6 G/DL — SIGNIFICANT CHANGE UP (ref 13–17)
MAGNESIUM SERPL-MCNC: 2.1 MG/DL — SIGNIFICANT CHANGE UP (ref 1.6–2.6)
MCHC RBC-ENTMCNC: 29.9 PG — SIGNIFICANT CHANGE UP (ref 27–34)
MCHC RBC-ENTMCNC: 34.4 GM/DL — SIGNIFICANT CHANGE UP (ref 32–36)
MCV RBC AUTO: 86.9 FL — SIGNIFICANT CHANGE UP (ref 80–100)
PHOSPHATE SERPL-MCNC: 2.6 MG/DL — SIGNIFICANT CHANGE UP (ref 2.5–4.5)
PHOSPHATE SERPL-MCNC: 2.9 MG/DL — SIGNIFICANT CHANGE UP (ref 2.5–4.5)
PLATELET # BLD AUTO: 272 K/UL — SIGNIFICANT CHANGE UP (ref 150–400)
POTASSIUM SERPL-MCNC: 4.2 MMOL/L — SIGNIFICANT CHANGE UP (ref 3.5–5.3)
POTASSIUM SERPL-SCNC: 4.2 MMOL/L — SIGNIFICANT CHANGE UP (ref 3.5–5.3)
RBC # BLD: 4.89 M/UL — SIGNIFICANT CHANGE UP (ref 4.2–5.8)
RBC # FLD: 12.8 % — SIGNIFICANT CHANGE UP (ref 10.3–14.5)
SODIUM SERPL-SCNC: 139 MMOL/L — SIGNIFICANT CHANGE UP (ref 135–145)
WBC # BLD: 7.75 K/UL — SIGNIFICANT CHANGE UP (ref 3.8–10.5)
WBC # FLD AUTO: 7.75 K/UL — SIGNIFICANT CHANGE UP (ref 3.8–10.5)

## 2017-10-15 PROCEDURE — 80048 BASIC METABOLIC PNL TOTAL CA: CPT

## 2017-10-15 PROCEDURE — 83690 ASSAY OF LIPASE: CPT

## 2017-10-15 PROCEDURE — 81003 URINALYSIS AUTO W/O SCOPE: CPT

## 2017-10-15 PROCEDURE — 85014 HEMATOCRIT: CPT

## 2017-10-15 PROCEDURE — 94660 CPAP INITIATION&MGMT: CPT

## 2017-10-15 PROCEDURE — 96374 THER/PROPH/DIAG INJ IV PUSH: CPT

## 2017-10-15 PROCEDURE — 84100 ASSAY OF PHOSPHORUS: CPT

## 2017-10-15 PROCEDURE — 82803 BLOOD GASES ANY COMBINATION: CPT

## 2017-10-15 PROCEDURE — 82435 ASSAY OF BLOOD CHLORIDE: CPT

## 2017-10-15 PROCEDURE — 84132 ASSAY OF SERUM POTASSIUM: CPT

## 2017-10-15 PROCEDURE — 80053 COMPREHEN METABOLIC PANEL: CPT

## 2017-10-15 PROCEDURE — 84295 ASSAY OF SERUM SODIUM: CPT

## 2017-10-15 PROCEDURE — 85730 THROMBOPLASTIN TIME PARTIAL: CPT

## 2017-10-15 PROCEDURE — 83735 ASSAY OF MAGNESIUM: CPT

## 2017-10-15 PROCEDURE — 82330 ASSAY OF CALCIUM: CPT

## 2017-10-15 PROCEDURE — 99285 EMERGENCY DEPT VISIT HI MDM: CPT | Mod: 25

## 2017-10-15 PROCEDURE — 96375 TX/PRO/DX INJ NEW DRUG ADDON: CPT

## 2017-10-15 PROCEDURE — 80061 LIPID PANEL: CPT

## 2017-10-15 PROCEDURE — 85027 COMPLETE CBC AUTOMATED: CPT

## 2017-10-15 PROCEDURE — 99239 HOSP IP/OBS DSCHRG MGMT >30: CPT

## 2017-10-15 PROCEDURE — 87086 URINE CULTURE/COLONY COUNT: CPT

## 2017-10-15 PROCEDURE — 85610 PROTHROMBIN TIME: CPT

## 2017-10-15 PROCEDURE — 83605 ASSAY OF LACTIC ACID: CPT

## 2017-10-15 PROCEDURE — 93005 ELECTROCARDIOGRAM TRACING: CPT

## 2017-10-15 PROCEDURE — 82947 ASSAY GLUCOSE BLOOD QUANT: CPT

## 2017-10-15 RX ORDER — PANTOPRAZOLE SODIUM 20 MG/1
1 TABLET, DELAYED RELEASE ORAL
Qty: 60 | Refills: 0 | OUTPATIENT
Start: 2017-10-15 | End: 2017-11-14

## 2017-10-15 RX ADMIN — Medication 40 MILLIGRAM(S): at 11:57

## 2017-10-15 RX ADMIN — MODAFINIL 100 MILLIGRAM(S): 200 TABLET ORAL at 12:01

## 2017-10-15 RX ADMIN — BUPROPION HYDROCHLORIDE 300 MILLIGRAM(S): 150 TABLET, EXTENDED RELEASE ORAL at 11:57

## 2017-10-15 RX ADMIN — AMLODIPINE BESYLATE 5 MILLIGRAM(S): 2.5 TABLET ORAL at 06:09

## 2017-10-15 RX ADMIN — HEPARIN SODIUM 5000 UNIT(S): 5000 INJECTION INTRAVENOUS; SUBCUTANEOUS at 06:09

## 2017-10-15 RX ADMIN — LAMOTRIGINE 100 MILLIGRAM(S): 25 TABLET, ORALLY DISINTEGRATING ORAL at 11:57

## 2017-10-15 RX ADMIN — PANTOPRAZOLE SODIUM 40 MILLIGRAM(S): 20 TABLET, DELAYED RELEASE ORAL at 06:09

## 2017-10-15 NOTE — PROGRESS NOTE ADULT - PROBLEM SELECTOR PLAN 2
Patient with history of chronic pancreatitis not on replacement enzymes at home  - f/u with GI as outpatient

## 2017-10-15 NOTE — PROGRESS NOTE ADULT - ATTENDING COMMENTS
Patient will be d/c home today and will f/p with PCP and GI upon D/C .      Moira Zuñiga M.D.   Hospitalist

## 2017-10-15 NOTE — PROGRESS NOTE ADULT - PROBLEM SELECTOR PLAN 6
- c/w clear liquid diet, consider advancing diet today - c/w regular low fat diet as patient tolerates

## 2017-10-15 NOTE — PROGRESS NOTE ADULT - SUBJECTIVE AND OBJECTIVE BOX
Interval events:  Pain resolved  No complaints today.  No fever/No chills. Tolerating PO diet.    PAST MEDICAL & SURGICAL HISTORY:  Liver disease: hepatitis c chronic  Cholelithiasis: s/p cholecystectomy 2014  Diverticulitis  Sleep apnea  Hyperlipemia  HTN (hypertension)  S/P cholecystectomy: 11/11/14 adn liver biopsy      MEDICATIONS  (STANDING):  sodium chloride 0.9%. 7000 milliLiter(s) (300 mL/Hr) IV Continuous <Continuous>    MEDICATIONS  (PRN):      Allergies    penicillins (Rash)    Intolerances        Review of Systems:    General:  No wt loss, fevers, chills, night sweats,fatigue,   CV:  No pain, palpitations, hypo/hypertension  Resp:  No dyspnea, cough, tachypnea, wheezing  GI:  See HPI  :  No pain, bleeding, incontinence, nocturia  Muscle:  No pain, weakness  Neuro:  No weakness, tingling, memory problems  Psych:  No fatigue, insomnia, mood problems, depression  Endocrine:  No polyuria, polydypsia, cold/heat intolerance  Heme:  No petechiae, ecchymosis, easy bruisability  Skin:  No rash, tattoos, scars, edema      Vital Signs Last 24 Hrs  T(C): 36.7 (15 Oct 2017 06:07), Max: 37.3 (14 Oct 2017 13:56)  T(F): 98.1 (15 Oct 2017 06:07), Max: 99.2 (14 Oct 2017 20:13)  HR: 74 (15 Oct 2017 06:07) (74 - 87)  BP: 146/96 (15 Oct 2017 06:07) (138/87 - 146/96)  BP(mean): --  RR: 18 (15 Oct 2017 06:07) (18 - 18)  SpO2: 95% (15 Oct 2017 06:07) (95% - 96%)    PHYSICAL EXAM:    Constitutional: NAD, well-developed  Neck: No LAD, supple  Respiratory: CTA and P  Cardiovascular: S1 and S2, RRR, no M  Gastrointestinal: BS+, soft, ND, neg HSM, non tender  Extremities: No peripheral edema, neg clubbing, cyanosis  Vascular: 2+ peripheral pulses  Neurological: A/O x 3, no focal deficits  Psychiatric: Normal mood, normal affect  Skin: No rashes    LABS:                        14.6   7.75  )-----------( 272      ( 15 Oct 2017 09:07 )             42.5     10-15    139  |  102  |  14  ----------------------------<  119<H>  4.2   |  23  |  1.19    Ca    9.8      15 Oct 2017 09:07  Phos  2.9     10-15  Mg     2.1     10-15

## 2017-10-15 NOTE — PROGRESS NOTE ADULT - SUBJECTIVE AND OBJECTIVE BOX
Patient is a 57y old  Male who presents with a chief complaint of abdominal pain (14 Oct 2017 17:30)      SUBJECTIVE / OVERNIGHT EVENTS: No acute events overnight. Patient reports abdominal pain resolved. No fevers, chills, nausea, vomiting. Reports 2 episodes of watery diarrhea last night. Denies hematochezia or melena.    MEDICATIONS  (STANDING):  amLODIPine   Tablet 5 milliGRAM(s) Oral daily  buPROPion XL . 300 milliGRAM(s) Oral daily  FLUoxetine 40 milliGRAM(s) Oral daily  heparin  Injectable 5000 Unit(s) SubCutaneous every 8 hours  lamoTRIgine 100 milliGRAM(s) Oral daily  modafinil 100 milliGRAM(s) Oral daily  pantoprazole    Tablet 40 milliGRAM(s) Oral two times a day before meals  sodium chloride 0.9%. 1000 milliLiter(s) (75 mL/Hr) IV Continuous <Continuous>    MEDICATIONS  (PRN):  acetaminophen   Tablet. 650 milliGRAM(s) Oral every 6 hours PRN Mild Pain (1 - 3)  HYDROmorphone   Tablet 2 milliGRAM(s) Oral every 6 hours PRN moderate and severe pain    I&O's Summary    14 Oct 2017 07:01  -  15 Oct 2017 07:00  --------------------------------------------------------  IN: 780 mL / OUT: 0 mL / NET: 780 mL    PHYSICAL EXAM:  GENERAL: NAD, well-developed  HEAD:  Atraumatic, Normocephalic  EYES: EOMI, PERRLA, conjunctiva and sclera clear  NECK: Supple, No JVD  CHEST/LUNG: Clear to auscultation bilaterally; No wheeze  HEART: Regular rate and rhythm; No murmurs, rubs, or gallops  ABDOMEN: Soft, Nontender, Nondistended; Bowel sounds present  EXTREMITIES:  2+ Peripheral Pulses, No clubbing, cyanosis, or edema  PSYCH: AAOx3  NEUROLOGY: non-focal  SKIN: No rashes or lesions    LABS:      RADIOLOGY & ADDITIONAL TESTS:    Imaging Personally Reviewed:    Consultant(s) Notes Reviewed:      Care Discussed with Consultants/Other Providers: Patient is a 57y old  Male who presents with a chief complaint of abdominal pain (14 Oct 2017 17:30)      SUBJECTIVE / OVERNIGHT EVENTS: No acute events overnight. Patient reports abdominal pain resolved. No fevers, chills, nausea, vomiting. Reports 2 episodes of watery diarrhea last night. Denies hematochezia or melena.    MEDICATIONS  (STANDING):  amLODIPine   Tablet 5 milliGRAM(s) Oral daily  buPROPion XL . 300 milliGRAM(s) Oral daily  FLUoxetine 40 milliGRAM(s) Oral daily  heparin  Injectable 5000 Unit(s) SubCutaneous every 8 hours  lamoTRIgine 100 milliGRAM(s) Oral daily  modafinil 100 milliGRAM(s) Oral daily  pantoprazole    Tablet 40 milliGRAM(s) Oral two times a day before meals  sodium chloride 0.9%. 1000 milliLiter(s) (75 mL/Hr) IV Continuous <Continuous>    MEDICATIONS  (PRN):  acetaminophen   Tablet. 650 milliGRAM(s) Oral every 6 hours PRN Mild Pain (1 - 3)  HYDROmorphone   Tablet 2 milliGRAM(s) Oral every 6 hours PRN moderate and severe pain    I&O's Summary    14 Oct 2017 07:01  -  15 Oct 2017 07:00  --------------------------------------------------------  IN: 780 mL / OUT: 0 mL / NET: 780 mL    PHYSICAL EXAM:  GENERAL: NAD, well-developed  HEAD:  Atraumatic, Normocephalic  EYES: EOMI, PERRLA, conjunctiva and sclera clear  NECK: Supple, No JVD  CHEST/LUNG: Clear to auscultation bilaterally; No wheeze  HEART: Regular rate and rhythm; No murmurs, rubs, or gallops  ABDOMEN: Soft, Nontender, Nondistended; Bowel sounds present  EXTREMITIES:  2+ Peripheral Pulses, No clubbing, cyanosis, or edema  PSYCH: AAOx3  NEUROLOGY: non-focal  SKIN: No rashes or lesions    LABS:                          14.6   7.75  )-----------( 272      ( 15 Oct 2017 09:07 )             42.5   10-15    139  |  102  |  14  ----------------------------<  119<H>  4.2   |  23  |  1.19    Ca    9.8      15 Oct 2017 09:07  Phos  2.9     10-15  Mg     2.1     10-15      RADIOLOGY & ADDITIONAL TESTS:    Imaging Personally Reviewed:    Consultant(s) Notes Reviewed:      Care Discussed with Consultants/Other Providers: Patient is a 57y old  Male who presents with a chief complaint of abdominal pain (14 Oct 2017 17:30)      SUBJECTIVE / OVERNIGHT EVENTS: No acute events overnight. Patient reports abdominal pain resolved. No fevers, chills, nausea, vomiting. Reports 2 episodes of watery diarrhea last night, but none today.  Denies hematochezia or melena.    MEDICATIONS  (STANDING):  amLODIPine   Tablet 5 milliGRAM(s) Oral daily  buPROPion XL . 300 milliGRAM(s) Oral daily  FLUoxetine 40 milliGRAM(s) Oral daily  heparin  Injectable 5000 Unit(s) SubCutaneous every 8 hours  lamoTRIgine 100 milliGRAM(s) Oral daily  modafinil 100 milliGRAM(s) Oral daily  pantoprazole    Tablet 40 milliGRAM(s) Oral two times a day before meals  sodium chloride 0.9%. 1000 milliLiter(s) (75 mL/Hr) IV Continuous <Continuous>    MEDICATIONS  (PRN):  acetaminophen   Tablet. 650 milliGRAM(s) Oral every 6 hours PRN Mild Pain (1 - 3)  HYDROmorphone   Tablet 2 milliGRAM(s) Oral every 6 hours PRN moderate and severe pain    I&O's Summary    14 Oct 2017 07:01  -  15 Oct 2017 07:00  --------------------------------------------------------  IN: 780 mL / OUT: 0 mL / NET: 780 mL    PHYSICAL EXAM:  GENERAL: NAD, well-developed  HEAD:  Atraumatic, Normocephalic  EYES: EOMI, PERRLA, conjunctiva and sclera clear  NECK: Supple, No JVD  CHEST/LUNG: Clear to auscultation bilaterally; No wheeze  HEART: Regular rate and rhythm; No murmurs, rubs, or gallops  ABDOMEN: Soft, Nontender, Nondistended; Bowel sounds present  EXTREMITIES:  2+ Peripheral Pulses, No clubbing, cyanosis, or edema  PSYCH: AAOx3  NEUROLOGY: non-focal  SKIN: No rashes or lesions    LABS:                          14.6   7.75  )-----------( 272      ( 15 Oct 2017 09:07 )             42.5   10-15    139  |  102  |  14  ----------------------------<  119<H>  4.2   |  23  |  1.19    Ca    9.8      15 Oct 2017 09:07  Phos  2.9     10-15  Mg     2.1     10-15      RADIOLOGY & ADDITIONAL TESTS:    Imaging Personally Reviewed:    Consultant(s) Notes Reviewed:  GI     Care Discussed with Consultants/Other Providers:

## 2017-10-15 NOTE — PROGRESS NOTE ADULT - ASSESSMENT
56 yo M w/PMH of HTN, CLEMENT on CPAP, chronic hep C s/p treatment, Depression, chronic pancreatitis (w/ recurrent bouts most recently in 8/2017 at [facility]) who p/w epigastric abdominal pain sent from his PMD with CT evidence of pancreatitis, reviewed by GI without evidence of acute pancreatitis 56 yo M w/PMH of HTN, CLEMENT on CPAP, chronic hep C s/p treatment, Depression, chronic pancreatitis (w/ recurrent bouts most recently in 8/2017 at [facility]) who p/w epigastric abdominal pain sent from his PMD with CT evidence of pancreatitis, reviewed by GI without evidence of acute pancreatitis.  Patient has tolerating PO diet.

## 2017-10-15 NOTE — PROGRESS NOTE ADULT - PROBLEM SELECTOR PLAN 1
Patient with clinical symptoms of epigastric pain radiating to back, per GI likely chronic pancreatitis, no EtOH use, no recent trauma or procedure  - other differential includes: NSAID induced gastritis, peptic ulcer disease  - BISAP score: 0, lipase wnl  - CT abdomen uploaded and reviewed by GI, not concerning for acute pancreatitis  - IVF discontinued  - c/w clear liquid diet for now, consider advancing to regular as patient tolerates  - patient has not needed pain control in the last 24 hours  - cardiology recs: Losartan held as it can rarely cause drug-induced pancreatitis  - c/w PPI BID Patient with clinical symptoms of epigastric pain radiating to back, per GI likely chronic pancreatitis, no EtOH use, no recent trauma or procedure  - other differential includes: NSAID induced gastritis, peptic ulcer disease  - BISAP score: 0, lipase wnl  - CT abdomen uploaded and reviewed by GI, not concerning for acute pancreatitis  - IVF discontinued  - c/w regular low fat diet  - patient has not needed pain control in the last 24 hours  - cardiology recs: Losartan held as it can rarely cause drug-induced pancreatitis  - c/w PPI BID

## 2018-07-06 ENCOUNTER — APPOINTMENT (OUTPATIENT)
Dept: SURGERY | Facility: CLINIC | Age: 58
End: 2018-07-06

## 2018-10-29 ENCOUNTER — EMERGENCY (EMERGENCY)
Facility: HOSPITAL | Age: 58
LOS: 1 days | Discharge: HOME | End: 2018-10-29
Admitting: EMERGENCY MEDICINE
Payer: COMMERCIAL

## 2018-10-29 VITALS
OXYGEN SATURATION: 100 % | RESPIRATION RATE: 18 BRPM | HEART RATE: 79 BPM | TEMPERATURE: 98 F | DIASTOLIC BLOOD PRESSURE: 97 MMHG | SYSTOLIC BLOOD PRESSURE: 166 MMHG

## 2018-10-29 DIAGNOSIS — F33.9 MAJOR DEPRESSIVE DISORDER, RECURRENT, UNSPECIFIED: ICD-10-CM

## 2018-10-29 PROBLEM — K86.1 OTHER CHRONIC PANCREATITIS: Chronic | Status: ACTIVE | Noted: 2017-10-13

## 2018-10-29 PROCEDURE — 90792 PSYCH DIAG EVAL W/MED SRVCS: CPT

## 2018-10-29 PROCEDURE — 99285 EMERGENCY DEPT VISIT HI MDM: CPT

## 2018-10-29 RX ORDER — ACETAMINOPHEN 500 MG
650 TABLET ORAL ONCE
Qty: 0 | Refills: 0 | Status: COMPLETED | OUTPATIENT
Start: 2018-10-29 | End: 2018-10-29

## 2018-10-29 RX ADMIN — Medication 650 MILLIGRAM(S): at 13:50

## 2018-10-29 NOTE — ED BEHAVIORAL HEALTH ASSESSMENT NOTE - OTHER
disability clinic partial compliance with medications but full compliance with follow up looks younger than stated age

## 2018-10-29 NOTE — ED BEHAVIORAL HEALTH ASSESSMENT NOTE - CURRENT MEDICATION
wellbutrin 300mg po daily, klonopin 0.5mg po bid, lamictal 100mg po daily, provogil 200mg po daily, prozac 40mg po daily

## 2018-10-29 NOTE — ED BEHAVIORAL HEALTH ASSESSMENT NOTE - HPI (INCLUDE ILLNESS QUALITY, SEVERITY, DURATION, TIMING, CONTEXT, MODIFYING FACTORS, ASSOCIATED SIGNS AND SYMPTOMS)
Patient is a 58 year old male domiciled in an apartment [place] [facility], unemployed since past few months, worked as a jewler for 18 years, quit due to disc herniation, unable to grab the tools at work and hunch over for too long,  for about 18 years, no prior psychiatric hospitlization, reports history of depression, compliant with treatment for about 8 years, Dr. Sascha León in [place], no history of self harming behaviors, no past SA, denies substance use, denies manic or psychotic symptoms, denies history of violence or arrests, denies trauma, bib _, in ___ grade at ___ school, with GOOD/FAIR/POOR grades, IEP in place/in regular classes/in special education, with a previous diagnosis of ___ , ___ prior hospitalizations, most recently at  ___, current outpatient treatment at ___ with ___, ___ hx of self harm behaviors, ___ prior suicide attempts, ___ manic or psychotic s/s, ___ hx of violence or arrests, ___ trauma, ___ substance use/abuse, with a past medical history of ___, brought in by ___, from ____, presenting with/seeking ___, in the context of ____ Patient is a 58 year old male domiciled in an apartment with wife and 2 cats in [facility], unemployed since past few months, worked as a jewler for 18 years, quit due to disc herniation, unable to grab the tools at work and hunch over for too long,  for about 18 years, no prior psychiatric hospitlization, reports history of depression, compliant with treatment for about 8 years, Dr. Sascha León in [place], no history of self harming behaviors, no past SA, denies substance use, denies manic or psychotic symptoms, denies history of violence or arrests, denies trauma, biba from physical/psychiatric disability clinic    During the interview, patient is calm, cooperative pleasant, reports he has been feeling depressed since not being able to work anymore, he went to the disability clinic and they called the ambulance and sent him here. he reports decreased sleep, interest, concentration, energy, worsening past month, he reports compliance with follow up but with partial compliance with his medications. patient reports intermitent suicidal thoughts for about a month but "no plan of killing self", he is goal-directed "I want to get better, I don't want to feel like this, I want to enjoy life" he has wife for support, friends and family. patient offered voluntary for his depression. patient prefers to follow up outpatient. Patient is a 58 year old male domiciled in an apartment with wife and 2 cats in [facility], unemployed since past few months, worked as a jeweler for 18 years, quit due to disc herniation, unable to grab the tools at work and hunch over for too long,  for about 18 years, no prior psychiatric hospitalization, reports history of depression, compliant with treatment for about 8 years, Dr. Sascha León in [place], no history of self harming behaviors, no past SA, denies substance use, denies manic or psychotic symptoms, denies history of violence or arrests, denies trauma, biba from physical/psychiatric disability clinic    During the interview, patient is calm, cooperative pleasant, reports he went to the clinic to obtain disability and he was sent here, they called the police after they asked him a few question. he has been feeling depressed since not being able to work anymore, he reports decreased sleep, interest, concentration, energy, worsening past month, he reports compliance with follow up but with partial compliance with his medications. patient reports intermittent suicidal thoughts for about a month but "no plan of killing self", he is goal-directed "I want to get better, I don't want to feel like this, I want to enjoy life" he has wife for support, friends and family. patient offered voluntary for his depression. patient prefers to follow up outpatient.    Writer spoke to wife: [phone] who agreed with plan to pick him up, he has no prior psychiatric hospitalizations, . wife then called NP back preferring patient be admitted. Writer then spoke to patient again who refused voluntary, contracted for safety, he preferred he go to his psychiatrist tomorrow then he will return back to ER or call 911 if any worsening of symptoms, he would also speak to his wife in person so reassure her. he has a good relationship with his wife. Messaged relayed to medical NP    Writer also clinic spoke to  Ying who stated the team is unavailable but she knows the patient well. he is compliant with his follow-up for many years. she would give the message to the psychiatrist to call back with any questions. Ms. He stated that the team is not aware of SI, it's new to them. agreed to give him next available appointment tomorrow at 230pm. Confirmed patient's medications: wellbutrin 300mg po daily, klonopin 0.5mg po bid, lamictal 100mg po daily, provogil 200mg po daily, prozac 40mg po daily

## 2018-10-29 NOTE — ED BEHAVIORAL HEALTH ASSESSMENT NOTE - RISK ASSESSMENT
Patient presents as low to moderate risk for harm to self/others, with risk factors including difficulty expressing emotions, maladaptive responses to not working, intermittent SI of which are outweighed by significant protective factors, including no previous suicide attempts, no history of violence, no access to firearms, no global insomnia, positive therapeutic relationships, supportive family and social supports, willingness to seek help, no suicidal/homicidal ideations intent or plans, hopefulness for future, ability to cope with stress,  frustration tolerance, engaging in discharge and safety planning, motivation to participate in outpatient treatment.

## 2018-10-29 NOTE — ED BEHAVIORAL HEALTH ASSESSMENT NOTE - DESCRIPTION
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

## 2018-10-29 NOTE — ED ADULT NURSE REASSESSMENT NOTE - NS ED NURSE REASSESS COMMENT FT1
pt calm & cooperative d/c by NP pt verbalizing understanding of d/c instructions pt denies Si/Hi/AVh presently resources provided by NP.

## 2018-10-29 NOTE — ED BEHAVIORAL HEALTH ASSESSMENT NOTE - SUMMARY
Patient is a 58 year old male domiciled in an apartment with wife and 2 cats in [facility], unemployed since past few months, worked as a jeweler for 18 years, quit due to disc herniation, unable to grab the tools at work and hunch over for too long,  for about 18 years, no prior psychiatric hospitalization, reports history of depression, compliant with treatment for about 8 years, Dr. Sascha León in [place], no history of self harming behaviors, no past SA, denies substance use, denies manic or psychotic symptoms, denies history of violence or arrests, denies trauma, biba from physical/psychiatric disability clinic.    Patient denies any si/hi, any intent plan, intermittent SI for about a month but never any intent/plan, goal directed to get better, get disability, offered voluntary but prefers to return back to outpatient. agreed to return to ER or call 911 if any worsening of symptoms. patient is not an imminent risk to self or others at this time, doesn't meet criteria for involuntary admission.

## 2018-10-29 NOTE — ED ADULT NURSE NOTE - OBJECTIVE STATEMENT
Received pt in  pt calm & cooperative c/o depression pt denies Si/Hi/AVh presently emotional reassurance provided safety & comfort measures maintained eval on going.

## 2018-10-29 NOTE — ED PROVIDER NOTE - PROGRESS NOTE DETAILS
JIM ANDINO MD: This patient was never directly seen by me.  I never performed a face to face HPI, ROS, PMHX or PE.  I was not involved in the MDM or the final disposition of the patient.  I was available for consultation during at least a portion of the patients stay in the Emergency Department.  I am co-signing the note of the nurse practitioner as per hospital protocol.

## 2018-10-29 NOTE — ED PROVIDER NOTE - OBJECTIVE STATEMENT
This is a 58 year old Male PMHX Cholelithiasis s/p cholecystectomy 2014 Chronic pancreatitis Diverticulitis HTN Liver disease  hepatitis c chronic Sleep apnea BIBIA from PCP for psych eval r/t suicidal ideations. Per EMS patient was at PCP today and reported intermittent suicidal thoughts. This is a 58 year old Male PMHX Cholelithiasis s/p cholecystectomy 2014 Chronic pancreatitis Diverticulitis HTN Liver disease  hepatitis c chronic Sleep apnea BIBIA from PCP for psych eval r/t suicidal ideations. Per EMS patient was at PCP today and reported intermittent suicidal thoughts. Reports increased depression with suicidal thoughts to "Jump in front of a bus" Currently compliant with medications NO past psych Denies HI Denies AH/VH Denies ETOH/Illicit drugs

## 2018-10-29 NOTE — ED PROVIDER NOTE - MEDICAL DECISION MAKING DETAILS
This is a 58 year old Male PMHX Cholelithiasis s/p cholecystectomy 2014 Chronic pancreatitis Diverticulitis HTN Liver disease  hepatitis c chronic Sleep apnea BIBIA from PCP for psych eval r/t suicidal ideations.  Medical evaluation performed. There is no clinical evidence of intoxication or any acute medical problem requiring immediate intervention. Final disposition will be determined by psychiatrist.

## 2018-10-29 NOTE — ED ADULT NURSE NOTE - NSIMPLEMENTINTERV_GEN_ALL_ED
Implemented All Universal Safety Interventions:  [place] to call system. Call bell, personal items and telephone within reach. Instruct patient to call for assistance. Room bathroom lighting operational. Non-slip footwear when patient is off stretcher. Physically safe environment: no spills, clutter or unnecessary equipment. Stretcher in lowest position, wheels locked, appropriate side rails in place.

## 2018-10-29 NOTE — ED BEHAVIORAL HEALTH ASSESSMENT NOTE - SUICIDE PROTECTIVE FACTORS
Supportive social network or family/Responsibility to family and others/Identifies reasons for living/High frustration tolerance/Future oriented/Positive therapeutic relationships

## 2018-11-07 NOTE — ED BEHAVIORAL HEALTH ASSESSMENT NOTE - FAMILY HISTORY OF SUBSTANCE ABUSE
[facility]   [address], [facility] [address]  [place] [address]  Phone: [phone]  Fax: [phone]              Elier Das  1989    Encounter Date: 11/7/2018    Danie Marcus M.D.          PROGRESS NOTE:  Chief Complaint   Patient presents with   • Chest Pain       Subjective:   Elier Das is a 29 y.o. male who presents today in consultation at the request of Roxana Dean for evaluation of chest pain.  This young man has intermittent sharp left subcostal pain that hurts to breathe.  He has been told it may be of displaced rib on the left side.  This is a chronic intermittent issue nonprogressive.    He states that since he was a child he has had mitral valve prolapse.  Previous examiners recently about hurting heart murmur.  He denies any palpitations.  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  PA and lateral chest x-ray in July 2018 was normal.  I have reviewed these images.  Previously ordered echocardiogram was not done.    Social history: He is single.  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  Prior history of IV drug use up until 6 months ago.  At least one pack of cigarettes per day.  No significant alcohol.  He states he is not using illicit drugs at this time.  Office EKG today demonstrates an RSR prime in V1 consistent with minor right intraventricular conduction defect, a normal variant.  Past Medical History:   Diagnosis Date   • IVDU (intravenous drug user) 7/17/2018     History reviewed. No pertinent surgical history.  Family History   Problem Relation Age of Onset   • Heart Disease Mother         Stents placed   • No Known Problems Father    • No Known Problems Sister      Social History     Social History   • Marital status: Single     Spouse name: N/A   • Number of children: N/A   • Years of education: N/A     Occupational  "History   •  Other     Social History Main Topics   • Smoking status: Current Every Day Smoker     Packs/day: 1.00     Years: 10.00     Types: Cigarettes   • Smokeless tobacco: Former User     Types: Chew   • Alcohol use Yes      Comment: occ   • Drug use: No   • Sexual activity: Yes     Partners: Female     Birth control/ protection: Condom     Other Topics Concern   • Not on file     Social History Narrative   • No narrative on file     No Known Allergies  Outpatient Encounter Prescriptions as of 11/7/2018   Medication Sig Dispense Refill   • [DISCONTINUED] naproxen (NAPROSYN) 500 MG Tab Take 1 Tab by mouth 2 times a day, with meals. (Patient not taking: Reported on 11/7/2018) 20 Tab 0   • [DISCONTINUED] hydrOXYzine (ATARAX) 25 MG Tab Take 1 Tab by mouth every 6 hours as needed for Anxiety. (Patient not taking: Reported on 11/7/2018) 21 Tab 0     No facility-administered encounter medications on file as of 11/7/2018.      Review of Systems   Constitutional: Negative for chills, fever and malaise/fatigue.   Eyes: Negative for blurred vision and discharge.   Respiratory: Negative for cough and shortness of breath.    Cardiovascular: Positive for chest pain. Negative for palpitations, leg swelling and PND.   Gastrointestinal: Negative for heartburn and nausea.   Genitourinary: Negative for dysuria and urgency.   Musculoskeletal: Negative for myalgias.   Skin: Negative for itching and rash.   Neurological: Negative for dizziness and headaches.   Endo/Heme/Allergies: Negative for environmental allergies. Does not bruise/bleed easily.   Psychiatric/Behavioral: Negative for depression. The patient is not nervous/anxious.         Objective:   /76 (BP Location: Right arm, Patient Position: Sitting, BP Cuff Size: Adult)   Pulse 80   Ht 1.803 m (5' 11\")   Wt 74.8 kg (165 lb)   SpO2 97%   BMI 23.01 kg/m²      Physical Exam   Constitutional: He is oriented to person, place, and time. He appears well-developed and " well-nourished.   Neck: No JVD present.   Cardiovascular: Normal rate and regular rhythm.  Exam reveals no gallop and no friction rub.    No murmur heard.  Auscultation both squatting and with standing demonstrates no significant click and certainly no murmur.   Pulmonary/Chest: Effort normal and breath sounds normal.   No tenderness on palpation of chest wall   Abdominal: Soft. There is no tenderness.   Musculoskeletal: He exhibits no edema or deformity.   Neurological: He is alert and oriented to person, place, and time.   Skin: Skin is warm and dry.       Assessment:     1. Encounter to establish care  EKG   2. Chest pain on breathing         Medical Decision Making:  Today's Assessment / Status / Plan:   He has classical chest wall pain of no clinical significance.  I reassured him regarding the benign nature of his chest pain.  Physical exam does not suggest mitral valve prolapse.  Strong reassurance with no further testing.  Certainly, the biggest concern medically is with tobacco use long-term and illicit drug use short-term.  This was communicated to him.  Thank you for this consultation.      Roxana Dean A.P.R.N.  [address] [place] [id number]  VIA In Basket                  None known

## 2019-03-25 ENCOUNTER — EMERGENCY (EMERGENCY)
Facility: HOSPITAL | Age: 59
LOS: 1 days | Discharge: HOME | End: 2019-03-25
Attending: EMERGENCY MEDICINE
Payer: COMMERCIAL

## 2019-03-25 VITALS
SYSTOLIC BLOOD PRESSURE: 143 MMHG | HEIGHT: 67 IN | WEIGHT: 175.05 LBS | TEMPERATURE: 98 F | HEART RATE: 84 BPM | RESPIRATION RATE: 16 BRPM | OXYGEN SATURATION: 98 % | DIASTOLIC BLOOD PRESSURE: 68 MMHG

## 2019-03-25 VITALS
DIASTOLIC BLOOD PRESSURE: 91 MMHG | SYSTOLIC BLOOD PRESSURE: 133 MMHG | RESPIRATION RATE: 18 BRPM | HEART RATE: 78 BPM | TEMPERATURE: 98 F | OXYGEN SATURATION: 98 %

## 2019-03-25 LAB
ALBUMIN SERPL ELPH-MCNC: 4.3 G/DL — SIGNIFICANT CHANGE UP (ref 3.3–5)
ALP SERPL-CCNC: 89 U/L — SIGNIFICANT CHANGE UP (ref 40–120)
ALT FLD-CCNC: 11 U/L — SIGNIFICANT CHANGE UP (ref 10–45)
ANION GAP SERPL CALC-SCNC: 13 MMOL/L — SIGNIFICANT CHANGE UP (ref 5–17)
APPEARANCE UR: CLEAR — SIGNIFICANT CHANGE UP
AST SERPL-CCNC: 19 U/L — SIGNIFICANT CHANGE UP (ref 10–40)
BASOPHILS # BLD AUTO: 0.1 K/UL — SIGNIFICANT CHANGE UP (ref 0–0.2)
BASOPHILS NFR BLD AUTO: 0.8 % — SIGNIFICANT CHANGE UP (ref 0–2)
BILIRUB SERPL-MCNC: 0.2 MG/DL — SIGNIFICANT CHANGE UP (ref 0.2–1.2)
BILIRUB UR-MCNC: NEGATIVE — SIGNIFICANT CHANGE UP
BUN SERPL-MCNC: 12 MG/DL — SIGNIFICANT CHANGE UP (ref 7–23)
CALCIUM SERPL-MCNC: 9.3 MG/DL — SIGNIFICANT CHANGE UP (ref 8.4–10.5)
CHLORIDE SERPL-SCNC: 102 MMOL/L — SIGNIFICANT CHANGE UP (ref 96–108)
CO2 SERPL-SCNC: 23 MMOL/L — SIGNIFICANT CHANGE UP (ref 22–31)
COLOR SPEC: YELLOW — SIGNIFICANT CHANGE UP
CREAT SERPL-MCNC: 0.88 MG/DL — SIGNIFICANT CHANGE UP (ref 0.5–1.3)
DIFF PNL FLD: NEGATIVE — SIGNIFICANT CHANGE UP
EOSINOPHIL # BLD AUTO: 0.3 K/UL — SIGNIFICANT CHANGE UP (ref 0–0.5)
EOSINOPHIL NFR BLD AUTO: 4.1 % — SIGNIFICANT CHANGE UP (ref 0–6)
GAS PNL BLDV: SIGNIFICANT CHANGE UP
GLUCOSE SERPL-MCNC: 191 MG/DL — HIGH (ref 70–99)
GLUCOSE UR QL: NEGATIVE — SIGNIFICANT CHANGE UP
HCT VFR BLD CALC: 43.6 % — SIGNIFICANT CHANGE UP (ref 39–50)
HGB BLD-MCNC: 15 G/DL — SIGNIFICANT CHANGE UP (ref 13–17)
KETONES UR-MCNC: NEGATIVE — SIGNIFICANT CHANGE UP
LEUKOCYTE ESTERASE UR-ACNC: NEGATIVE — SIGNIFICANT CHANGE UP
LIDOCAIN IGE QN: 15 U/L — SIGNIFICANT CHANGE UP (ref 7–60)
LYMPHOCYTES # BLD AUTO: 1.4 K/UL — SIGNIFICANT CHANGE UP (ref 1–3.3)
LYMPHOCYTES # BLD AUTO: 18.6 % — SIGNIFICANT CHANGE UP (ref 13–44)
MCHC RBC-ENTMCNC: 30.6 PG — SIGNIFICANT CHANGE UP (ref 27–34)
MCHC RBC-ENTMCNC: 34.4 GM/DL — SIGNIFICANT CHANGE UP (ref 32–36)
MCV RBC AUTO: 88.8 FL — SIGNIFICANT CHANGE UP (ref 80–100)
MONOCYTES # BLD AUTO: 0.6 K/UL — SIGNIFICANT CHANGE UP (ref 0–0.9)
MONOCYTES NFR BLD AUTO: 8.7 % — SIGNIFICANT CHANGE UP (ref 2–14)
NEUTROPHILS # BLD AUTO: 5.1 K/UL — SIGNIFICANT CHANGE UP (ref 1.8–7.4)
NEUTROPHILS NFR BLD AUTO: 67.8 % — SIGNIFICANT CHANGE UP (ref 43–77)
NITRITE UR-MCNC: NEGATIVE — SIGNIFICANT CHANGE UP
PH UR: 6.5 — SIGNIFICANT CHANGE UP (ref 5–8)
PLATELET # BLD AUTO: 299 K/UL — SIGNIFICANT CHANGE UP (ref 150–400)
POTASSIUM SERPL-MCNC: 3.7 MMOL/L — SIGNIFICANT CHANGE UP (ref 3.5–5.3)
POTASSIUM SERPL-SCNC: 3.7 MMOL/L — SIGNIFICANT CHANGE UP (ref 3.5–5.3)
PROT SERPL-MCNC: 7.4 G/DL — SIGNIFICANT CHANGE UP (ref 6–8.3)
PROT UR-MCNC: SIGNIFICANT CHANGE UP
RBC # BLD: 4.9 M/UL — SIGNIFICANT CHANGE UP (ref 4.2–5.8)
RBC # FLD: 12.3 % — SIGNIFICANT CHANGE UP (ref 10.3–14.5)
SODIUM SERPL-SCNC: 138 MMOL/L — SIGNIFICANT CHANGE UP (ref 135–145)
SP GR SPEC: 1.02 — SIGNIFICANT CHANGE UP (ref 1.01–1.02)
UROBILINOGEN FLD QL: NEGATIVE — SIGNIFICANT CHANGE UP
WBC # BLD: 7.5 K/UL — SIGNIFICANT CHANGE UP (ref 3.8–10.5)
WBC # FLD AUTO: 7.5 K/UL — SIGNIFICANT CHANGE UP (ref 3.8–10.5)

## 2019-03-25 PROCEDURE — 96375 TX/PRO/DX INJ NEW DRUG ADDON: CPT

## 2019-03-25 PROCEDURE — 99284 EMERGENCY DEPT VISIT MOD MDM: CPT | Mod: 25

## 2019-03-25 PROCEDURE — 96374 THER/PROPH/DIAG INJ IV PUSH: CPT | Mod: XU

## 2019-03-25 PROCEDURE — 87086 URINE CULTURE/COLONY COUNT: CPT

## 2019-03-25 PROCEDURE — 81003 URINALYSIS AUTO W/O SCOPE: CPT

## 2019-03-25 PROCEDURE — 83690 ASSAY OF LIPASE: CPT

## 2019-03-25 PROCEDURE — 82947 ASSAY GLUCOSE BLOOD QUANT: CPT

## 2019-03-25 PROCEDURE — 82330 ASSAY OF CALCIUM: CPT

## 2019-03-25 PROCEDURE — 82803 BLOOD GASES ANY COMBINATION: CPT

## 2019-03-25 PROCEDURE — 85014 HEMATOCRIT: CPT

## 2019-03-25 PROCEDURE — 85027 COMPLETE CBC AUTOMATED: CPT

## 2019-03-25 PROCEDURE — 83605 ASSAY OF LACTIC ACID: CPT

## 2019-03-25 PROCEDURE — 74177 CT ABD & PELVIS W/CONTRAST: CPT | Mod: 26

## 2019-03-25 PROCEDURE — 80053 COMPREHEN METABOLIC PANEL: CPT

## 2019-03-25 PROCEDURE — 84295 ASSAY OF SERUM SODIUM: CPT

## 2019-03-25 PROCEDURE — 84132 ASSAY OF SERUM POTASSIUM: CPT

## 2019-03-25 PROCEDURE — 74177 CT ABD & PELVIS W/CONTRAST: CPT

## 2019-03-25 PROCEDURE — 82435 ASSAY OF BLOOD CHLORIDE: CPT

## 2019-03-25 RX ORDER — ACETAMINOPHEN 500 MG
1000 TABLET ORAL ONCE
Qty: 0 | Refills: 0 | Status: COMPLETED | OUTPATIENT
Start: 2019-03-25 | End: 2019-03-25

## 2019-03-25 RX ORDER — SODIUM CHLORIDE 9 MG/ML
1000 INJECTION INTRAMUSCULAR; INTRAVENOUS; SUBCUTANEOUS ONCE
Qty: 0 | Refills: 0 | Status: COMPLETED | OUTPATIENT
Start: 2019-03-25 | End: 2019-03-25

## 2019-03-25 RX ORDER — METRONIDAZOLE 500 MG
1 TABLET ORAL
Qty: 21 | Refills: 0 | OUTPATIENT
Start: 2019-03-25 | End: 2019-03-31

## 2019-03-25 RX ORDER — MORPHINE SULFATE 50 MG/1
2 CAPSULE, EXTENDED RELEASE ORAL ONCE
Qty: 0 | Refills: 0 | Status: DISCONTINUED | OUTPATIENT
Start: 2019-03-25 | End: 2019-03-25

## 2019-03-25 RX ORDER — KETOROLAC TROMETHAMINE 30 MG/ML
15 SYRINGE (ML) INJECTION ONCE
Qty: 0 | Refills: 0 | Status: DISCONTINUED | OUTPATIENT
Start: 2019-03-25 | End: 2019-03-25

## 2019-03-25 RX ORDER — CIPROFLOXACIN LACTATE 400MG/40ML
1 VIAL (ML) INTRAVENOUS
Qty: 7 | Refills: 0 | OUTPATIENT
Start: 2019-03-25

## 2019-03-25 RX ORDER — METRONIDAZOLE 500 MG
500 TABLET ORAL ONCE
Qty: 0 | Refills: 0 | Status: COMPLETED | OUTPATIENT
Start: 2019-03-25 | End: 2019-03-25

## 2019-03-25 RX ORDER — CIPROFLOXACIN LACTATE 400MG/40ML
500 VIAL (ML) INTRAVENOUS ONCE
Qty: 0 | Refills: 0 | Status: COMPLETED | OUTPATIENT
Start: 2019-03-25 | End: 2019-03-25

## 2019-03-25 RX ADMIN — Medication 400 MILLIGRAM(S): at 09:24

## 2019-03-25 RX ADMIN — Medication 500 MILLIGRAM(S): at 14:08

## 2019-03-25 RX ADMIN — MORPHINE SULFATE 2 MILLIGRAM(S): 50 CAPSULE, EXTENDED RELEASE ORAL at 16:00

## 2019-03-25 RX ADMIN — Medication 1000 MILLIGRAM(S): at 11:30

## 2019-03-25 RX ADMIN — MORPHINE SULFATE 2 MILLIGRAM(S): 50 CAPSULE, EXTENDED RELEASE ORAL at 14:23

## 2019-03-25 RX ADMIN — Medication 15 MILLIGRAM(S): at 14:22

## 2019-03-25 RX ADMIN — Medication 15 MILLIGRAM(S): at 12:02

## 2019-03-25 RX ADMIN — SODIUM CHLORIDE 2000 MILLILITER(S): 9 INJECTION INTRAMUSCULAR; INTRAVENOUS; SUBCUTANEOUS at 09:14

## 2019-03-25 NOTE — ED ADULT NURSE REASSESSMENT NOTE - NS ED NURSE REASSESS COMMENT FT1
Received report from Porsha CHARLES, patient currently at Ct will reassess upon arrival back to [facility].

## 2019-03-25 NOTE — ED PROVIDER NOTE - OBJECTIVE STATEMENT
58 M with depression, hx of pancreatitis, diverticulitis, HTN, hep C s/p treatment, with 4 days of LLQ pain radiating to suprapubic area for 4 days, slowly worsening, constant, described as sharp and worse with movement. He has been feeling weak and tired for ~2d now, having intermittent loose and solid brown stools w/ minimal blood on toilet paper, notes he has a known hx of hemorrhoids. He has been having increased urinary frequency but notes he has also been drinking more fluids than usual for him. Denies associated CP/sob, f/c, n/v, urinary sx, recent straining to have BM, sick contacts or abx use.

## 2019-03-25 NOTE — ED ADULT NURSE REASSESSMENT NOTE - NS ED NURSE REASSESS COMMENT FT1
2017    Jesús Brooks MD  [address] [place] [address]           OTOLARYNGOLOGY AND COMMUNICATION SCIENCES    Zach Quesada MD, FACS          SURGICAL AND MEDICAL DISEASES OF HEAD AND NECK  MD Zach Lopez MD, FACS  Aroldo Albright III, MD    OTOLOGY, NEUROTOLOGY and SKULL-BASE SURGERY  Josue Gonzalez MD, FACS  Jareth Stacy MD, Director    PEDIATRIC OTOLARYNGOLOGY & OTOLOGY  BERTHA Robles MD, FAAP  Janett Miles MD, FACS    FACIAL PLASTIC and RECONSTRUCTIVE SURGERY  ANNE-MARIE Hyde III, MD, FACS    RHINOLOGY and SINUS SURGERY  Agustín Vincent MD, MPH, FACS  Director   ANNE-MARIE Hyde III, MD, FACS    LARYNGOLOGY  Hong Das MD    SPEECH-LANGUAGE PATHOLOGY  Katina Coleman, PhD, [facility]SLP  Temi Samaniego, MS, CCC-SLP  Dilma Cabrera, MS, CCC-SLP  Cyn Romo MA, [facility]SLP    AUDIOLOGY SECTION  Janet Anthony, MS, CCC-A  RHONA Ray, CCC-A  Maria Teresa Malave, CCC-A  Maria Teresa Keeys, CCC-A  Graham Brewer Jr., RHONA, CCA-A  RHONA Galarza, CCC-A  Maria Teresa Cleveland, CCC-A    ADVANCED PRACTICE CLINICIANS  Head and Neck Surgical Oncology  ISIDRO Back, FNP-C  Pedatric Otolaryngology  ISIDRO Du, PNP-C       Re:  Zen Boyce  :  1977    Dear Dr. Brooks,    Thank you for referring your patient, Zen Boyce, to us for evaluation and treatment.  I have enclosed a copy of my clinic note for your review and records.  If you have any questions please do not hesitate to contact our office.     Thank you for allowing me to participate in the care of your patient.    Sincerely,        Agustín Vincent MD, MPH, FACS    Director, Rhinology and Sinus Surgery  Department of Otorhinolaryngology  [facility]    Encl:  Progress note       [facility] [address]  phone [phone]  fax [phone]   Patient states improvement in pain, tolerated PO, denies n/v/d fevers, chills. Updated on plan for dc. [email or web address]

## 2019-03-25 NOTE — ED PROCEDURE NOTE - PROCEDURE ADDITIONAL DETAILS
Emergency Department Focused Ultrasound performed at patient's bedside for educational purposes. The study will have a follow up study performed computed tomography and was performed in the direct supervision of an ultrasound trained attending.     There is some noted hyperechoic material noted in the bladder, please correlate with urinalysis and CT imaging. Dr. Carvalho of the treating team was made aware of this finding at 12:00hours.

## 2019-03-25 NOTE — ED ADULT NURSE REASSESSMENT NOTE - NS ED NURSE REASSESS COMMENT FT1
Patient back from CT, complaining of LLQ and periumbilical pain, MD Carvalho aware, to order pain medication. VSS Denies CP, SOB, n/v/d/ fevers, chills, HA, blurry vision, numbness and tingling in upper and lower extremities.

## 2019-03-25 NOTE — ED PROVIDER NOTE - NSFOLLOWUPINSTRUCTIONS_ED_ALL_ED_FT
Please seek care if you have new chest pain, shortness of breath, fevers, inability to eat or drink, or worsening of symptoms that brought you to the emergency room today.  Please follow up with your primary care physician in 1-2 days or as soon as possible. Follow up with gastroenterology, number above, regarding future diverticulitis care.    Take acetaminophen 650mg every 6 hours as needed for pain or fever. See the drug information panel on the packaging for more information.  Take percocet once every 8 hours only as needed for pain.

## 2019-03-25 NOTE — ED PROVIDER NOTE - NSFOLLOWUPCLINICS_GEN_ALL_ED_FT
[facility]  Gastroenterology  [address] [place] [id number]  Phone: [phone]  Fax:   Follow Up Time:

## 2019-03-25 NOTE — ED PROVIDER NOTE - CLINICAL SUMMARY MEDICAL DECISION MAKING FREE TEXT BOX
57 y/o M here with abdominal pain LLQ in the setting of known hx of diverticulitis. Likely due to the same as he has a hx of diverticulitis however colitis vs nephrolithiasis are also possibilities. Plan: CT, labs, meds, IVF

## 2019-03-25 NOTE — ED ADULT NURSE NOTE - NSIMPLEMENTINTERV_GEN_ALL_ED
Implemented All Universal Safety Interventions:  [place] to call system. Call bell, personal items and telephone within reach. Instruct patient to call for assistance. Room bathroom lighting operational. Non-slip footwear when patient is off stretcher. Physically safe environment: no spills, clutter or unnecessary equipment. Stretcher in lowest position, wheels locked, appropriate side rails in place.

## 2019-03-25 NOTE — ED ADULT NURSE REASSESSMENT NOTE - NS ED NURSE REASSESS COMMENT FT1
Patient complaining of continued  LLQ pain, MD Carvalho aware. Given abx per MD order and crackers for PO challenge.

## 2019-03-25 NOTE — ED PROVIDER NOTE - ATTENDING CONTRIBUTION TO CARE
58 M with depression, hx of pancreatitis, diverticulitis, HTN, hep C s/p treatment, with 4 days of LLQ pain radiating to suprapubic area for 4 days feels like his prior diverticulitis, gu exam nl, llq tend on exam. ct ordered, labs, ua.

## 2019-03-25 NOTE — ED ADULT NURSE NOTE - OBJECTIVE STATEMENT
58y male with hx of htn, diverticulitis, pancreatitis presents to the ER c/o LLQ abdominal pain. pt is a&ox4 and speaking coherently. PT states that since friday he has had sharp constant LQ pain that also moves midline. pt states it feels similar to past diverticulitis pain. Pt is in nad, vss. pt denies fevers, chills, cp, sob, n/v. 58y male with hx of htn, diverticulitis, pancreatitis presents to the ER c/o LLQ abdominal pain. pt is a&ox4 and speaking coherently. PT states that since friday he has had sharp constant LQ pain that also moves midline. pt states it feels similar to past diverticulitis pain. Pt is in nad, vss. pt denies fevers, chills, cp, sob, n/v. pt denies taking anything for pain prior to coming. Pt tender in LLQ. MD smart completed. will reassess.

## 2019-03-26 LAB
CULTURE RESULTS: SIGNIFICANT CHANGE UP
SPECIMEN SOURCE: SIGNIFICANT CHANGE UP

## 2019-05-14 NOTE — ED BEHAVIORAL HEALTH ASSESSMENT NOTE - PERSONAL COLLATERAL RELATIONSHIP
[Use of Plain Language] : use of plain language [None] : none [Adequate] : adequate Wife of 18 years

## 2019-05-29 ENCOUNTER — EMERGENCY (EMERGENCY)
Facility: HOSPITAL | Age: 59
LOS: 1 days | Discharge: HOME | End: 2019-05-29
Attending: EMERGENCY MEDICINE
Payer: COMMERCIAL

## 2019-05-29 VITALS
WEIGHT: 175.05 LBS | HEART RATE: 89 BPM | OXYGEN SATURATION: 98 % | TEMPERATURE: 98 F | HEIGHT: 67 IN | SYSTOLIC BLOOD PRESSURE: 149 MMHG | RESPIRATION RATE: 17 BRPM | DIASTOLIC BLOOD PRESSURE: 92 MMHG

## 2019-05-29 VITALS — HEART RATE: 88 BPM | SYSTOLIC BLOOD PRESSURE: 161 MMHG | DIASTOLIC BLOOD PRESSURE: 97 MMHG

## 2019-05-29 LAB
ALBUMIN SERPL ELPH-MCNC: 4.6 G/DL — SIGNIFICANT CHANGE UP (ref 3.3–5)
ALP SERPL-CCNC: 92 U/L — SIGNIFICANT CHANGE UP (ref 40–120)
ALT FLD-CCNC: 13 U/L — SIGNIFICANT CHANGE UP (ref 10–45)
ANION GAP SERPL CALC-SCNC: 14 MMOL/L — SIGNIFICANT CHANGE UP (ref 5–17)
APTT BLD: 27.3 SEC — LOW (ref 27.5–36.3)
AST SERPL-CCNC: 21 U/L — SIGNIFICANT CHANGE UP (ref 10–40)
BASOPHILS # BLD AUTO: 0 K/UL — SIGNIFICANT CHANGE UP (ref 0–0.2)
BASOPHILS NFR BLD AUTO: 0.2 % — SIGNIFICANT CHANGE UP (ref 0–2)
BILIRUB SERPL-MCNC: 0.5 MG/DL — SIGNIFICANT CHANGE UP (ref 0.2–1.2)
BUN SERPL-MCNC: 14 MG/DL — SIGNIFICANT CHANGE UP (ref 7–23)
CALCIUM SERPL-MCNC: 9.1 MG/DL — SIGNIFICANT CHANGE UP (ref 8.4–10.5)
CHLORIDE SERPL-SCNC: 102 MMOL/L — SIGNIFICANT CHANGE UP (ref 96–108)
CO2 SERPL-SCNC: 21 MMOL/L — LOW (ref 22–31)
CREAT SERPL-MCNC: 0.8 MG/DL — SIGNIFICANT CHANGE UP (ref 0.5–1.3)
EOSINOPHIL # BLD AUTO: 0.3 K/UL — SIGNIFICANT CHANGE UP (ref 0–0.5)
EOSINOPHIL NFR BLD AUTO: 2.1 % — SIGNIFICANT CHANGE UP (ref 0–6)
GLUCOSE SERPL-MCNC: 206 MG/DL — HIGH (ref 70–99)
HCT VFR BLD CALC: 44.9 % — SIGNIFICANT CHANGE UP (ref 39–50)
HGB BLD-MCNC: 16 G/DL — SIGNIFICANT CHANGE UP (ref 13–17)
INR BLD: 0.95 RATIO — SIGNIFICANT CHANGE UP (ref 0.88–1.16)
LIDOCAIN IGE QN: 91 U/L — HIGH (ref 7–60)
LYMPHOCYTES # BLD AUTO: 1.5 K/UL — SIGNIFICANT CHANGE UP (ref 1–3.3)
LYMPHOCYTES # BLD AUTO: 12 % — LOW (ref 13–44)
MCHC RBC-ENTMCNC: 31.4 PG — SIGNIFICANT CHANGE UP (ref 27–34)
MCHC RBC-ENTMCNC: 35.8 GM/DL — SIGNIFICANT CHANGE UP (ref 32–36)
MCV RBC AUTO: 87.9 FL — SIGNIFICANT CHANGE UP (ref 80–100)
MONOCYTES # BLD AUTO: 0.9 K/UL — SIGNIFICANT CHANGE UP (ref 0–0.9)
MONOCYTES NFR BLD AUTO: 7.4 % — SIGNIFICANT CHANGE UP (ref 2–14)
NEUTROPHILS # BLD AUTO: 9.9 K/UL — HIGH (ref 1.8–7.4)
NEUTROPHILS NFR BLD AUTO: 78.3 % — HIGH (ref 43–77)
PLATELET # BLD AUTO: 295 K/UL — SIGNIFICANT CHANGE UP (ref 150–400)
POTASSIUM SERPL-MCNC: 4 MMOL/L — SIGNIFICANT CHANGE UP (ref 3.5–5.3)
POTASSIUM SERPL-SCNC: 4 MMOL/L — SIGNIFICANT CHANGE UP (ref 3.5–5.3)
PROT SERPL-MCNC: 7.9 G/DL — SIGNIFICANT CHANGE UP (ref 6–8.3)
PROTHROM AB SERPL-ACNC: 10.8 SEC — SIGNIFICANT CHANGE UP (ref 10–12.9)
RBC # BLD: 5.1 M/UL — SIGNIFICANT CHANGE UP (ref 4.2–5.8)
RBC # FLD: 11.7 % — SIGNIFICANT CHANGE UP (ref 10.3–14.5)
SODIUM SERPL-SCNC: 137 MMOL/L — SIGNIFICANT CHANGE UP (ref 135–145)
TROPONIN T, HIGH SENSITIVITY RESULT: <6 NG/L — SIGNIFICANT CHANGE UP (ref 0–51)
WBC # BLD: 12.7 K/UL — HIGH (ref 3.8–10.5)
WBC # FLD AUTO: 12.7 K/UL — HIGH (ref 3.8–10.5)

## 2019-05-29 PROCEDURE — 83690 ASSAY OF LIPASE: CPT

## 2019-05-29 PROCEDURE — 96374 THER/PROPH/DIAG INJ IV PUSH: CPT | Mod: XU

## 2019-05-29 PROCEDURE — 99284 EMERGENCY DEPT VISIT MOD MDM: CPT | Mod: 25

## 2019-05-29 PROCEDURE — 71046 X-RAY EXAM CHEST 2 VIEWS: CPT | Mod: 26

## 2019-05-29 PROCEDURE — 99285 EMERGENCY DEPT VISIT HI MDM: CPT

## 2019-05-29 PROCEDURE — 74177 CT ABD & PELVIS W/CONTRAST: CPT

## 2019-05-29 PROCEDURE — 71046 X-RAY EXAM CHEST 2 VIEWS: CPT

## 2019-05-29 PROCEDURE — 85027 COMPLETE CBC AUTOMATED: CPT

## 2019-05-29 PROCEDURE — 93005 ELECTROCARDIOGRAM TRACING: CPT

## 2019-05-29 PROCEDURE — 96375 TX/PRO/DX INJ NEW DRUG ADDON: CPT

## 2019-05-29 PROCEDURE — 96361 HYDRATE IV INFUSION ADD-ON: CPT

## 2019-05-29 PROCEDURE — 85610 PROTHROMBIN TIME: CPT

## 2019-05-29 PROCEDURE — 85730 THROMBOPLASTIN TIME PARTIAL: CPT

## 2019-05-29 PROCEDURE — 80053 COMPREHEN METABOLIC PANEL: CPT

## 2019-05-29 PROCEDURE — 74177 CT ABD & PELVIS W/CONTRAST: CPT | Mod: 26

## 2019-05-29 PROCEDURE — 84484 ASSAY OF TROPONIN QUANT: CPT

## 2019-05-29 RX ORDER — LIDOCAINE 4 G/100G
10 CREAM TOPICAL ONCE
Refills: 0 | Status: COMPLETED | OUTPATIENT
Start: 2019-05-29 | End: 2019-05-29

## 2019-05-29 RX ORDER — OXYCODONE HYDROCHLORIDE 5 MG/1
1 TABLET ORAL
Qty: 12 | Refills: 0
Start: 2019-05-29 | End: 2019-05-31

## 2019-05-29 RX ORDER — AMLODIPINE BESYLATE 2.5 MG/1
5 TABLET ORAL ONCE
Refills: 0 | Status: COMPLETED | OUTPATIENT
Start: 2019-05-29 | End: 2019-05-29

## 2019-05-29 RX ORDER — MORPHINE SULFATE 50 MG/1
4 CAPSULE, EXTENDED RELEASE ORAL ONCE
Refills: 0 | Status: DISCONTINUED | OUTPATIENT
Start: 2019-05-29 | End: 2019-05-29

## 2019-05-29 RX ORDER — OXYCODONE HYDROCHLORIDE 5 MG/1
5 TABLET ORAL ONCE
Refills: 0 | Status: DISCONTINUED | OUTPATIENT
Start: 2019-05-29 | End: 2019-05-29

## 2019-05-29 RX ORDER — FAMOTIDINE 10 MG/ML
20 INJECTION INTRAVENOUS ONCE
Refills: 0 | Status: COMPLETED | OUTPATIENT
Start: 2019-05-29 | End: 2019-05-29

## 2019-05-29 RX ORDER — SODIUM CHLORIDE 9 MG/ML
1000 INJECTION INTRAMUSCULAR; INTRAVENOUS; SUBCUTANEOUS ONCE
Refills: 0 | Status: COMPLETED | OUTPATIENT
Start: 2019-05-29 | End: 2019-05-29

## 2019-05-29 RX ADMIN — OXYCODONE HYDROCHLORIDE 5 MILLIGRAM(S): 5 TABLET ORAL at 17:20

## 2019-05-29 RX ADMIN — MORPHINE SULFATE 4 MILLIGRAM(S): 50 CAPSULE, EXTENDED RELEASE ORAL at 11:41

## 2019-05-29 RX ADMIN — LIDOCAINE 10 MILLILITER(S): 4 CREAM TOPICAL at 10:36

## 2019-05-29 RX ADMIN — AMLODIPINE BESYLATE 5 MILLIGRAM(S): 2.5 TABLET ORAL at 17:36

## 2019-05-29 RX ADMIN — MORPHINE SULFATE 4 MILLIGRAM(S): 50 CAPSULE, EXTENDED RELEASE ORAL at 12:00

## 2019-05-29 RX ADMIN — SODIUM CHLORIDE 1000 MILLILITER(S): 9 INJECTION INTRAMUSCULAR; INTRAVENOUS; SUBCUTANEOUS at 11:41

## 2019-05-29 RX ADMIN — Medication 30 MILLILITER(S): at 09:23

## 2019-05-29 RX ADMIN — SODIUM CHLORIDE 1000 MILLILITER(S): 9 INJECTION INTRAMUSCULAR; INTRAVENOUS; SUBCUTANEOUS at 12:41

## 2019-05-29 RX ADMIN — OXYCODONE HYDROCHLORIDE 5 MILLIGRAM(S): 5 TABLET ORAL at 16:00

## 2019-05-29 RX ADMIN — FAMOTIDINE 20 MILLIGRAM(S): 10 INJECTION INTRAVENOUS at 09:23

## 2019-05-29 NOTE — ED ADULT TRIAGE NOTE - CHIEF COMPLAINT QUOTE
pt c/o epigastric burning that radiates to L chest x 24 hrs associated with tingling of B/L upper extremities.

## 2019-05-29 NOTE — ED PROVIDER NOTE - NSFOLLOWUPINSTRUCTIONS_ED_ALL_ED_FT
1.  Stay well hydrated.  keep urine clear to pale yellow  2.  Take tylenol 1000mgs every 6 hrs as needed for mild pain       Take Oxycodone 5mgs every 6 hrs as needed for severe pain  3.  Liquid diet and advance as tolerated.  Avoid fried or fatty foods  4.  Follow up with your PMD and GI, Dr. Ragland uponj discharge.  5.  Return to the ER for worsening pain, fevers/chills, persistent nausea/vomiting or any other concerning symptoms

## 2019-05-29 NOTE — ED ADULT NURSE REASSESSMENT NOTE - NS ED NURSE REASSESS COMMENT FT1
CXR completed. Resting comfortably in stretcher, unlabored, spontaneous respirations, NAD. CM in place NSR HR 70.

## 2019-05-29 NOTE — ED PROVIDER NOTE - ATTENDING CONTRIBUTION TO CARE
Pt with h/o pancreatitis presents with epigastric pain radiating to chest and L arm, nontender on exam, appears well, nontoxic, EKG normal.  Screening labs and symptomatic relief, consider ct scan if labs abnormal.

## 2019-05-29 NOTE — ED ADULT NURSE NOTE - OBJECTIVE STATEMENT
57 y/o male PMH anxiety presents to ED reporting 59 y/o male PMH anxiety and diverticulitis presents to ED reporting chest pain. Pt reports L sided epigastric pain, radiating to L chest, 7/10. Pt denies worsening on exertion or SOB. On exam, AOx3, speaking in complete sentences. Lung sounds CTA, NAD. Abdomen soft, tender LUQ, non-distended, normoactive bowel sounds in all 4 quadrants. Pt denies SOB, dizziness, fever/chills, n/v/d at this time. Heplock placed, labs sent. EKG completed and given to attending MD. CM in place NSR HR 77. Awaiting CXR at this time. PA and MD at bedside.

## 2019-05-29 NOTE — ED PROVIDER NOTE - OBJECTIVE STATEMENT
57 yo male with PMHx of depression, hx of pancreatitis, diverticulitis, HTN, hep C s/p treatment p/w epigastric pain.  patient reports that his pain started yesterday morning in the epigastrium.  Pain was initially "burning" and well localized. Over the last 24 hrs pain has become worse and now radiates into the left upper chest.  No exertional component.  Mild associated SOB.  Patient reports that he was diagnosed with diverticulitis a few days ago and is currently taking cipro/flagyl.  No recent alcohol use.  Denies fevers/chills, nausea/vomiting/diarrhea.  No association with food. 59 yo male with PMHx of depression, hx of pancreatitis, diverticulitis, HTN, hep C s/p treatment p/w epigastric pain.  patient reports that his pain started yesterday morning in the epigastrium.  Pain was initially "burning" and well localized. Over the last 24 hrs pain has become worse and now radiates into the left upper chest.  No exertional component.  Mild associated SOB.  No recent alcohol use.  Denies fevers/chills, nausea/vomiting/diarrhea.  No association with food.

## 2019-05-29 NOTE — ED ADULT NURSE REASSESSMENT NOTE - NS ED NURSE REASSESS COMMENT FT1
Pt reports pain has returned and requesting to eat. NURY Yip aware, awaiting CT results at this time.

## 2019-05-29 NOTE — ED PROVIDER NOTE - CARE PLAN
Principal Discharge DX:	Epigastric pain Principal Discharge DX:	Epigastric pain  Secondary Diagnosis:	Chronic pancreatitis Principal Discharge DX:	Epigastric pain  Assessment and plan of treatment:	1.  Stay well hydrated.  keep urine clear to pale yellow  2.  Take tylenol 1000mgs every 6 hrs as needed for mild pain       Take Oxycodone 5mgs every 6 hrs as needed for severe pain  3.  Liquid diet and advance as tolerated.  Avoid fried or fatty foods  4.  Return to the ER for worsening pain, fevers/chills, persistent nausea/vomiting or any other concerning symptoms  Secondary Diagnosis:	Chronic pancreatitis

## 2019-05-29 NOTE — ED PROVIDER NOTE - PLAN OF CARE
1.  Stay well hydrated.  keep urine clear to pale yellow  2.  Take tylenol 1000mgs every 6 hrs as needed for mild pain       Take Oxycodone 5mgs every 6 hrs as needed for severe pain  3.  Liquid diet and advance as tolerated.  Avoid fried or fatty foods  4.  Return to the ER for worsening pain, fevers/chills, persistent nausea/vomiting or any other concerning symptoms

## 2019-05-29 NOTE — ED PROVIDER NOTE - PROGRESS NOTE DETAILS
Dr. Butterfield Note: lipase slightly elevated, possible acute on chronic pancreatitis.  However, pt still in moderate-severe pain despite meds.  Will ct scan to look for other causes or for complications from pancreatitis. CT showing acute pancreatitis.  Patient currently tolerating PO.  Will continue IVF and attempt PO pain control.  If pain adequately controlled, can likely DC with close outpatient follow up.  -Phi Moncada PA-C

## 2019-05-29 NOTE — ED PROVIDER NOTE - PHYSICAL EXAMINATION
Gen: AAO x 3, NAD  Skin: No rashes or lesions  HEENT: NC/AT, PERRLA, EOMI, MMM  Resp: unlabored CTAB  Cardiac: rrr s1s2, no murmurs, rubs or gallops  GI: ND, +BS, Soft, epigastric TTP  Ext: no pedal edema, FROM in all extremities  Neuro: no focal deficits

## 2019-10-03 NOTE — ED PROVIDER NOTE - CROS ED SKIN ALL NEG
[facility]      cc:   ALEJA Gaviria M.D. PREOPERATIVE DIAGNOSIS:  1 CM PIGMENTED LESION, LEFT LATERAL NECK. POSTOPERATIVE DIAGNOSIS:  1 cm pigmented lesion, left lateral neck. PROCEDURE:  EXCISION OF PIGMENTED LESION, LEFT LATERAL NECK. DESCRIPTION OF PROCEDURE:  After routine preparation, the patient was prepped and draped. Region was  infiltrated with 1% plain lidocaine with Neut. Elliptical incision was made  and lesion was excised. Bleeding points coagulated. Wound was irrigated. Subcu was closed with interrupted 3-0 Monocryl, skin was closed with  subcuticular 4-0 Monocryl, Dermabond was applied. The patient tolerated the  procedure well under local anesthesia, left the operating room to recovery  room in good condition. Case was done as an outpatient under local  anesthesia. Becca Barahona M.D.       Hector Sugar  MRN#:  [id number]  ACCT#:  [de-identified]  DATE OF SURGERY:  05/05/2018  [place]  [facility] [place]  DICTATED BY: Becca Barahona M.D.  DD: 05/05/2018 DT: 05/05/2018 TD: 14:53 TT: 15:06 K#: [id number]    REPORT OF OPERATION                           Operative Report - 1           Electronically Signed On 05/05/2018 18:18  __________________________________________________   [facility]
negative...

## 2021-12-10 NOTE — ED PROVIDER NOTE - PHYSICAL EXAMINATION
General: Alert and Oriented. No apparent distress.  Head: Normocephalic and atraumatic.  Eyes: PERRLA with EOMI.  Neck: Supple. Trachea midline.   Cardiac: Normal S1 and S2 w/ RRR. No murmurs appreciated.   Pulmonary: Vesicular breath sounds bilaterally. No increased WOB. No wheezes or crackles.  Abdominal: Soft, TTP throughout all 4 quadrants. No rebound tenderness or guarding. no CVA tenderness  Neurologic: No focal sensory or motor deficits.  Musculoskeletal: Strength appropriate in all 4 extremities for age with no limited ROM.  Skin: Color appropriate for race. Intact, warm, and well-perfused.  Psychiatric: Appropriate mood and affect. No apparent risk to self or others.
[Negative] : Constitutional

## 2022-07-05 NOTE — PATIENT PROFILE ADULT. - FUNCTIONAL SCREEN CURRENT LEVEL: EATING, MLM
Upon review of the In Basket request and the patient's chart, initial outreach has been made via fax, please see Contacts section for details       Thank you  Praneeth Alexander MA (0) independent

## 2023-01-26 NOTE — ED ADULT NURSE NOTE - PRIMARY CARE PROVIDER
sushil General Sunscreen Counseling: I recommended a broad spectrum sunscreen with a SPF of 30 or higher.  I explained that SPF 30 sunscreens block approximately 97 percent of the sun's harmful rays.  Sunscreens should be applied at least 15 minutes prior to expected sun exposure and then every 2 hours after that as long as sun exposure continues. If swimming or exercising sunscreen should be reapplied every 45 minutes to an hour after getting wet or sweating.  One ounce, or the equivalent of a shot glass full of sunscreen, is adequate to protect the skin not covered by a bathing suit. I also recommended a lip balm with a sunscreen as well. Sun protective clothing can be used in lieu of sunscreen but must be worn the entire time you are exposed to the sun's rays. Detail Level: Detailed

## 2023-09-15 NOTE — ED ADULT TRIAGE NOTE - MEANS OF ARRIVAL
Refill request received for trazodone or FeroSul  Met protocol   Last office visit: 8/3/2023  Next office visit: 11/2/2023  Last refill: 07/17/2023 / 03/28/2023   Last labs: 07/27/2023 CBC and CMP    Filled per protocol   ambulatory

## 2023-09-16 NOTE — PROGRESS NOTE ADULT - PROBLEM SELECTOR PROBLEM 2
You were evaluated in the Emergency Department and deemed to not be an emergent situation at this time.  Please follow-up as advised by the cardiology team.  Return if symptoms worsen her heart rate goes over 170 consistently with chest pain.  Please return if there is fever or additional symptoms not discussed today.  Patient will continue to be monitored via Holter monitor when seen at next appointment.  Thank you for choosing your care here [facility].   Essential hypertension

## 2025-04-28 ENCOUNTER — OUTPATIENT (OUTPATIENT)
Dept: OUTPATIENT SERVICES | Facility: HOSPITAL | Age: 65
LOS: 1 days | End: 2025-04-28
Payer: MEDICARE

## 2025-04-28 ENCOUNTER — APPOINTMENT (OUTPATIENT)
Dept: RADIOLOGY | Facility: IMAGING CENTER | Age: 65
End: 2025-04-28
Payer: MEDICARE

## 2025-04-28 DIAGNOSIS — Z00.8 ENCOUNTER FOR OTHER GENERAL EXAMINATION: ICD-10-CM

## 2025-04-28 PROCEDURE — 71046 X-RAY EXAM CHEST 2 VIEWS: CPT

## 2025-04-28 PROCEDURE — 71046 X-RAY EXAM CHEST 2 VIEWS: CPT | Mod: 26

## 2025-05-02 ENCOUNTER — EMERGENCY (EMERGENCY)
Facility: HOSPITAL | Age: 65
LOS: 1 days | End: 2025-05-02
Attending: EMERGENCY MEDICINE
Payer: MEDICARE

## 2025-05-02 ENCOUNTER — EMERGENCY (EMERGENCY)
Facility: HOSPITAL | Age: 65
LOS: 1 days | End: 2025-05-02
Attending: EMERGENCY MEDICINE

## 2025-05-02 VITALS
OXYGEN SATURATION: 98 % | SYSTOLIC BLOOD PRESSURE: 171 MMHG | DIASTOLIC BLOOD PRESSURE: 108 MMHG | HEIGHT: 67 IN | RESPIRATION RATE: 20 BRPM | TEMPERATURE: 98 F | HEART RATE: 76 BPM | WEIGHT: 149.91 LBS

## 2025-05-02 VITALS
OXYGEN SATURATION: 99 % | RESPIRATION RATE: 16 BRPM | TEMPERATURE: 98 F | HEART RATE: 74 BPM | DIASTOLIC BLOOD PRESSURE: 87 MMHG | SYSTOLIC BLOOD PRESSURE: 148 MMHG

## 2025-05-02 VITALS
TEMPERATURE: 98 F | RESPIRATION RATE: 18 BRPM | DIASTOLIC BLOOD PRESSURE: 90 MMHG | OXYGEN SATURATION: 98 % | HEART RATE: 94 BPM | SYSTOLIC BLOOD PRESSURE: 169 MMHG

## 2025-05-02 VITALS
WEIGHT: 154.98 LBS | SYSTOLIC BLOOD PRESSURE: 160 MMHG | TEMPERATURE: 98 F | RESPIRATION RATE: 20 BRPM | HEART RATE: 95 BPM | OXYGEN SATURATION: 97 % | DIASTOLIC BLOOD PRESSURE: 112 MMHG | HEIGHT: 67 IN

## 2025-05-02 LAB
ALBUMIN SERPL ELPH-MCNC: 4.4 G/DL — SIGNIFICANT CHANGE UP (ref 3.3–5)
ALP SERPL-CCNC: 71 U/L — SIGNIFICANT CHANGE UP (ref 40–120)
ALT FLD-CCNC: <5 U/L — LOW (ref 10–45)
ANION GAP SERPL CALC-SCNC: 13 MMOL/L — SIGNIFICANT CHANGE UP (ref 5–17)
APPEARANCE UR: CLEAR — SIGNIFICANT CHANGE UP
AST SERPL-CCNC: 14 U/L — SIGNIFICANT CHANGE UP (ref 10–40)
BACTERIA # UR AUTO: NEGATIVE /HPF — SIGNIFICANT CHANGE UP
BASOPHILS # BLD AUTO: 0.04 K/UL — SIGNIFICANT CHANGE UP (ref 0–0.2)
BASOPHILS NFR BLD AUTO: 0.5 % — SIGNIFICANT CHANGE UP (ref 0–2)
BILIRUB SERPL-MCNC: 0.5 MG/DL — SIGNIFICANT CHANGE UP (ref 0.2–1.2)
BILIRUB UR-MCNC: NEGATIVE — SIGNIFICANT CHANGE UP
BUN SERPL-MCNC: 14 MG/DL — SIGNIFICANT CHANGE UP (ref 7–23)
CALCIUM SERPL-MCNC: 9.2 MG/DL — SIGNIFICANT CHANGE UP (ref 8.4–10.5)
CAST: 0 /LPF — SIGNIFICANT CHANGE UP (ref 0–4)
CHLORIDE SERPL-SCNC: 104 MMOL/L — SIGNIFICANT CHANGE UP (ref 96–108)
CO2 SERPL-SCNC: 24 MMOL/L — SIGNIFICANT CHANGE UP (ref 22–31)
COLOR SPEC: YELLOW — SIGNIFICANT CHANGE UP
CREAT SERPL-MCNC: 0.83 MG/DL — SIGNIFICANT CHANGE UP (ref 0.5–1.3)
DIFF PNL FLD: NEGATIVE — SIGNIFICANT CHANGE UP
EGFR: 97 ML/MIN/1.73M2 — SIGNIFICANT CHANGE UP
EGFR: 97 ML/MIN/1.73M2 — SIGNIFICANT CHANGE UP
EOSINOPHIL # BLD AUTO: 0.18 K/UL — SIGNIFICANT CHANGE UP (ref 0–0.5)
EOSINOPHIL NFR BLD AUTO: 2.2 % — SIGNIFICANT CHANGE UP (ref 0–6)
GLUCOSE SERPL-MCNC: 114 MG/DL — HIGH (ref 70–99)
GLUCOSE UR QL: NEGATIVE MG/DL — SIGNIFICANT CHANGE UP
HCT VFR BLD CALC: 45.5 % — SIGNIFICANT CHANGE UP (ref 39–50)
HGB BLD-MCNC: 15.4 G/DL — SIGNIFICANT CHANGE UP (ref 13–17)
IMM GRANULOCYTES NFR BLD AUTO: 0.4 % — SIGNIFICANT CHANGE UP (ref 0–0.9)
KETONES UR-MCNC: NEGATIVE MG/DL — SIGNIFICANT CHANGE UP
LEUKOCYTE ESTERASE UR-ACNC: NEGATIVE — SIGNIFICANT CHANGE UP
LYMPHOCYTES # BLD AUTO: 1.57 K/UL — SIGNIFICANT CHANGE UP (ref 1–3.3)
LYMPHOCYTES # BLD AUTO: 19 % — SIGNIFICANT CHANGE UP (ref 13–44)
MCHC RBC-ENTMCNC: 29.5 PG — SIGNIFICANT CHANGE UP (ref 27–34)
MCHC RBC-ENTMCNC: 33.8 G/DL — SIGNIFICANT CHANGE UP (ref 32–36)
MCV RBC AUTO: 87.2 FL — SIGNIFICANT CHANGE UP (ref 80–100)
MONOCYTES # BLD AUTO: 0.75 K/UL — SIGNIFICANT CHANGE UP (ref 0–0.9)
MONOCYTES NFR BLD AUTO: 9.1 % — SIGNIFICANT CHANGE UP (ref 2–14)
NEUTROPHILS # BLD AUTO: 5.71 K/UL — SIGNIFICANT CHANGE UP (ref 1.8–7.4)
NEUTROPHILS NFR BLD AUTO: 68.8 % — SIGNIFICANT CHANGE UP (ref 43–77)
NITRITE UR-MCNC: NEGATIVE — SIGNIFICANT CHANGE UP
NRBC BLD AUTO-RTO: 0 /100 WBCS — SIGNIFICANT CHANGE UP (ref 0–0)
PH UR: 6 — SIGNIFICANT CHANGE UP (ref 5–8)
PLATELET # BLD AUTO: 262 K/UL — SIGNIFICANT CHANGE UP (ref 150–400)
POTASSIUM SERPL-MCNC: 4.3 MMOL/L — SIGNIFICANT CHANGE UP (ref 3.5–5.3)
POTASSIUM SERPL-SCNC: 4.3 MMOL/L — SIGNIFICANT CHANGE UP (ref 3.5–5.3)
PROT SERPL-MCNC: 7.5 G/DL — SIGNIFICANT CHANGE UP (ref 6–8.3)
PROT UR-MCNC: NEGATIVE MG/DL — SIGNIFICANT CHANGE UP
RBC # BLD: 5.22 M/UL — SIGNIFICANT CHANGE UP (ref 4.2–5.8)
RBC # FLD: 12.8 % — SIGNIFICANT CHANGE UP (ref 10.3–14.5)
RBC CASTS # UR COMP ASSIST: 0 /HPF — SIGNIFICANT CHANGE UP (ref 0–4)
SODIUM SERPL-SCNC: 141 MMOL/L — SIGNIFICANT CHANGE UP (ref 135–145)
SP GR SPEC: 1.01 — SIGNIFICANT CHANGE UP (ref 1–1.03)
SQUAMOUS # UR AUTO: 0 /HPF — SIGNIFICANT CHANGE UP (ref 0–5)
UROBILINOGEN FLD QL: 0.2 MG/DL — SIGNIFICANT CHANGE UP (ref 0.2–1)
WBC # BLD: 8.28 K/UL — SIGNIFICANT CHANGE UP (ref 3.8–10.5)
WBC # FLD AUTO: 8.28 K/UL — SIGNIFICANT CHANGE UP (ref 3.8–10.5)
WBC UR QL: 0 /HPF — SIGNIFICANT CHANGE UP (ref 0–5)

## 2025-05-02 PROCEDURE — 74177 CT ABD & PELVIS W/CONTRAST: CPT | Mod: MC

## 2025-05-02 PROCEDURE — 74177 CT ABD & PELVIS W/CONTRAST: CPT | Mod: 26

## 2025-05-02 PROCEDURE — 99284 EMERGENCY DEPT VISIT MOD MDM: CPT | Mod: 25

## 2025-05-02 PROCEDURE — 99282 EMERGENCY DEPT VISIT SF MDM: CPT

## 2025-05-02 PROCEDURE — 80053 COMPREHEN METABOLIC PANEL: CPT

## 2025-05-02 PROCEDURE — 51702 INSERT TEMP BLADDER CATH: CPT | Mod: XU

## 2025-05-02 PROCEDURE — 96374 THER/PROPH/DIAG INJ IV PUSH: CPT | Mod: XU

## 2025-05-02 PROCEDURE — 85025 COMPLETE CBC W/AUTO DIFF WBC: CPT

## 2025-05-02 PROCEDURE — 76770 US EXAM ABDO BACK WALL COMP: CPT

## 2025-05-02 PROCEDURE — 99285 EMERGENCY DEPT VISIT HI MDM: CPT

## 2025-05-02 PROCEDURE — 99284 EMERGENCY DEPT VISIT MOD MDM: CPT

## 2025-05-02 PROCEDURE — 99053 MED SERV 10PM-8AM 24 HR FAC: CPT

## 2025-05-02 PROCEDURE — 87086 URINE CULTURE/COLONY COUNT: CPT

## 2025-05-02 PROCEDURE — 81001 URINALYSIS AUTO W/SCOPE: CPT

## 2025-05-02 RX ORDER — ACETAMINOPHEN 500 MG/5ML
1000 LIQUID (ML) ORAL ONCE
Refills: 0 | Status: COMPLETED | OUTPATIENT
Start: 2025-05-02 | End: 2025-05-02

## 2025-05-02 RX ORDER — PHENAZOPYRIDINE HCL 100 MG
200 TABLET ORAL ONCE
Refills: 0 | Status: COMPLETED | OUTPATIENT
Start: 2025-05-02 | End: 2025-05-02

## 2025-05-02 RX ADMIN — Medication 200 MILLIGRAM(S): at 12:10

## 2025-05-02 RX ADMIN — Medication 400 MILLIGRAM(S): at 09:42

## 2025-05-02 NOTE — ED PROVIDER NOTE - PATIENT PORTAL LINK FT
You can access the FollowMyHealth Patient Portal offered by [facility] by registering at the following website: [email or web address]. By joining [employer]’s FollowMyHealth portal, you will also be able to view your health information using other applications (apps) compatible with our system.

## 2025-05-02 NOTE — ED ADULT NURSE NOTE - OBJECTIVE STATEMENT
63 y/o male arrives to the ER complaining of leaking from the Schmitz.  Pt reports being d/c earlier today with a Schmitz on place, at arrival to the hospital pt felt that the Schmitz was leaking from the tip of the insertion in the penis. On assessment pt is well appearing, A&Ox4, speaking coherently, airway is patent, breathing spontaneously and unlabored. Skin is dry, warm.

## 2025-05-02 NOTE — ED ADULT NURSE NOTE - NSICDXPASTMEDICALHX_GEN_ALL_CORE_FT
PAST MEDICAL HISTORY:  Cholelithiasis s/p cholecystectomy 2014    Chronic pancreatitis     Diverticulitis     HTN (hypertension)     Hyperlipemia     Liver disease hepatitis c chronic    Sleep apnea

## 2025-05-02 NOTE — ED PROVIDER NOTE - ATTENDING APP SHARED VISIT CONTRIBUTION OF CARE
Chief Complaint:    - Inability to urinate   HPI:    - The patient is a male who presents to the Emergency Department with acute urinary retention. He reports that he has been unable to urinate since last night. This issue has been recurring over the past few weeks. The patient states he has been prescribed medication for this problem, which he identifies as 'Aflo-something' (likely Alfuzosin or a similar alpha-blocker). He reports having had an MRI recently to rule out cancer, which was negative. The patient describes waking up multiple times during the night attempting to urinate without success. He also notes some constipation, having had a bowel movement yesterday but 'not normally'. The patient denies any fever, chills, nausea, or vomiting. This urinary issue has been ongoing for about a month, with a previous episode of partial urinary retention about a week ago that resolved spontaneously.   Past Medical History:    -  Benign Prostatic Hyperplasia (inferred from medication and symptoms)    -  Recent urinary retention episodes   Past Surgical History:    -  Cholecystectomy (gallbladder removal)   Medications:    -  Alpha-blocker (possibly Alfuzosin, exact name unknown to patient)   Review of Systems:    -  Genitourinary: Positive for urinary retention    -  Gastrointestinal: Positive for constipation    -  Constitutional: Negative for fever, chills    -  Gastrointestinal: Negative for nausea, vomiting   Physical Examination:    -  Bladder scan: 609 mL residual volume; moderate distress 2/2 pain, + suprapubic fullness and ttp, no flank pain, no rash petechiae, vesicles, pleasant, cooperative   Labs and Studies:    -  Recent MRI (performed outside of ED, negative for cancer), ua and labs considered, patient without infectious symptoms and one day duration will hold ua and labs at this time and they can be drawn outpatient if desired for baseline cr/bun after johnson has been in place.    Medical Decision Making:    - The patient presents with acute urinary retention, likely due to benign prostatic hyperplasia (BPH). His history of recent similar episodes, current medication for BPH, and large bladder residual volume on ultrasound (609 mL) support this diagnosis. The recent negative MRI helps rule out more serious causes such as prostate cancer. Given the patient's discomfort and inability to void, I decided to proceed with urinary catheterization. A Johnson catheter will be placed to relieve the acute retention and allow the urethral tissues to recover from inflammation. The catheter will remain in place for approximately one week to ensure adequate bladder drainage and tissue recovery. I have advised the patient on the importance of leaving the catheter in place to prevent recurrence of retention within 6-8 hours of removal. The patient will be discharged with the Johnson catheter and leg bag, with instructions to follow up with his urologist, Dr. Hernandez, for a trial of void and further management of his BPH. During this follow-up, the urologist will likely perform a trial voiding by filling the bladder and assessing the patient's ability to urinate independently. If unsuccessful, the catheter may need to be reinserted. I have also recommended addressing the patient's constipation, as this can exacerbate urinary symptoms. The patient will be instructed on proper catheter care, signs of urinary tract infection to watch for, and when to seek immediate medical attention.   Impression:    -  Acute urinary retention    -  Benign Prostatic Hyperplasia (BPH)    -  Constipation

## 2025-05-02 NOTE — ED ADULT NURSE NOTE - OBJECTIVE STATEMENT
65 y/o male arrives to the ER complaining of urinary retention. Pt reports  having  difficulty urination, having the need to go, however unable to void for at least 12 hours. Pt reports having similar episode to this in the past few weeks, however self resolve. Pt denies SOB, chest pain, dizziness, N/V/D fevers, chills.  On assessment pt is well appearing, A&Ox4, speaking coherently, airway is patent, breathing spontaneously and unlabored. Skin is dry, warm. Safety and comfort measures provided to keep patient safe. Bed placed in lowest position and side rails raised.

## 2025-05-02 NOTE — ED PROVIDER NOTE - ATTENDING APP SHARED VISIT CONTRIBUTION OF CARE
PMD Obie pcp, [facility]  64-year male past med history urinary difficulties for the past month, seen by urology as an outpatient treated with Flomax.  Presented earlier this morning for complaint of retention, treated with Schmitz catheter, patient complaining of abdominal pain urine was negative, CT nonactionable, patient was discharged and now returns with complaints of urinating around Schmitz catheter.  Patient endorses mild discomfort.  No fever chills, chest pain, shortness breath, palpitations, nvdc, habits, hypertension, diabetes, hyperlipidemia, CVA,ca.  Physical exam adult male awake alert GCS 15  HEENT normocephalic atraumatic.  Chest clear A&P.  CV no rubs gallops murmur.  Abdomen soft positive bowel sounds.  No CVA tenderness.  Neuro GCS 15 speech fluent moves extremities.\  Ray Mayo MD, Facep PMD Obie pcp, [facility]  64-year male past med history urinary difficulties for the past month, seen by urology as an outpatient treated with Flomax.  Presented earlier this morning for complaint of retention, treated with Johnson catheter, patient complaining of abdominal pain urine was negative, CT nonactionable, patient was discharged and now returns with complaints of urinating around Johnson catheter.  Patient endorses mild discomfort.  No fever chills, chest pain, shortness breath, palpitations, nvdc, habits, hypertension, diabetes, hyperlipidemia, CVA,ca.  Physical exam adult male awake alert GCS 15  HEENT normocephalic atraumatic.  Chest clear A&P.  CV no rubs gallops murmur.  Abdomen soft positive bowel sounds.  No CVA tenderness.  Neuro GCS 15 speech fluent moves extremities.  gu johnson in place no ttp/swelling bladder scan neg.  Ray Mayo MD, Facep

## 2025-05-02 NOTE — CHART NOTE - NSCHARTNOTEFT_GEN_A_CORE
Called to assess patient for leakage of urine around the johnson catheter.  States he felt pain and discomfort associated with leakage of urine around the johnson catheter and no drainage of urine into the bag while walking up the stairs; he returned to the ER.  He had a johnson catheter placed earlier today for urinary retention.  He states that the pain improved after some time and the urine started draining again into the bag on its own.  Catheter is currently draining orange-red urine.    I repositioned the catheter by deflating the balloon and advancing it to the hub and reinflating it.  Discussed with the patient that he may be having bladder spasms related to having the catheter and that it may occur again.  Patient may follow up with his urologist Dr Read next week for definitive management of urinary retention. Called to assess patient for leakage of urine around the johnson catheter.  States he felt pain and discomfort associated with leakage of urine around the johnson catheter and no drainage of urine into the bag while walking up the stairs; he returned to the ER.  He had a johnson catheter placed earlier today for urinary retention.  He states that the pain improved after some time and the urine started draining again into the bag on its own.  Catheter is currently draining orange-red urine.    I repositioned the catheter by deflating the balloon, advancing it to the hub and reinflating it.  Discussed with the patient that he may be having bladder spasms related to having the catheter and that it may occur again.  Patient may follow up with his urologist Dr Read next week for definitive management of urinary retention. Called to assess patient for leakage of urine around the johnson catheter.  States he felt pain and discomfort associated with leakage of urine around the johnson catheter and no drainage of urine into the bag while walking up the stairs; he returned to the ER.  He had a johnson catheter placed earlier today for urinary retention.  He states that the pain improved after some time and the urine started draining again into the bag on its own.  Catheter is currently draining orange-red urine.    I repositioned the catheter by deflating the balloon, advancing it to the hub and reinflating it.  Discussed with the patient that he may be having bladder spasms related to having the catheter and that it may occur again.  Patient may follow up with his urologist Dr Read next week for definitive management of urinary retention.  D/w Dr Villa.

## 2025-05-02 NOTE — ED ADULT NURSE NOTE - NSICDXFAMILYHX_GEN_ALL_CORE_FT
FAMILY HISTORY:  Mother  Still living? Unknown  Family history of thrombosis or embolism, Age at diagnosis: Age Unknown

## 2025-05-02 NOTE — ED PROVIDER NOTE - NSFOLLOWUPINSTRUCTIONS_ED_ALL_ED_FT
Follow up with your Urologist in 2-3 days or call our clinic at [phone]//[phone].     Please empty your Schmitz catheter bag when it fills, be careful not to allow it to be tugged or pulled.     Constipation can exacerbate and worsen urinary outlet obstruction. Stay hydrated, Drink at least 2 Liters or 64 Ounces of water each day.   Take with Miralax and/or Senna to ensure you have regular bowel movements, 1-2 a day, decrease or discontinue the senna if you begin to have 4 four or more bowel movements a day.     There were no signs of an emergency medical condition on completion of today's workup.  You will need further medical care and evaluation. A presumptive diagnosis of urinary outlet obstruction has been made, however further evaluation may be required by your primary care doctor or specialist for a definitive diagnosis.      Follow up with your medical doctor in 2-3 days or call our clinic at [phone] and state you were seen in the Emergency Department and would like to be seen in clinic. You may also call [phone] to speak with a representative to assist follow up care with medicine, surgery, or specialists.    If you are having pain, take Tylenol/acetaminophen 1 g every six hours and supplement (if allowed by your physician, and if you're not having gastric/gastrointestinal/stomach/intestinal problems) with ibuprofen 600 mg, with food or milk/maalox, every six hours which can be taken three hours apart from the Tylenol to have a layered effect.     Be sure to take no more than 4000mg or 4g of Tylenol/acetaminophen in a 24 hour period. Be sure to check your other medications to see if they include Tylenol/acetaminophen and include them in your calculations to ensure you do not take more than 4000mg or 4g of Tylenol/acetaminophen a day.    Drink at least 2 Liters or 64 Ounces of water each day (UNLESS you are supposed to restrict fluids or have a history of congestive heart failure (CHF)).    Return for any persistent, worsening symptoms, or ANY concerns at all.

## 2025-05-02 NOTE — ED PROVIDER NOTE - PATIENT PORTAL LINK FT
You can access the FollowMyHealth Patient Portal offered by [facility] by registering at the following website: [email or web address]. By joining [facility]’s FollowMyHealth portal, you will also be able to view your health information using other applications (apps) compatible with our system.

## 2025-05-02 NOTE — ED PROVIDER NOTE - PROGRESS NOTE DETAILS
Urology paged Johnson repositioned by Urology PA, patient cleared for  d/c to f/u with his Urologist in office, johnson remains in place. - REGI BarberC

## 2025-05-02 NOTE — ED PROVIDER NOTE - PROGRESS NOTE DETAILS
Pt had johnson placed by ED RN with 600cc clear yellow urine in johnson bag. Pt reports persistent lower abdominal pain. On exam, + diffuse lower abdominal ttp with guarding. Plan for labs, UA, CT a/p. - Hodan Winchester PA-C patient given pyridium secondary to lower abdominal pain and ct noted cystitis vs inflammation secondary to uoo  The patient was serially evaluated throughout emergency department course by the team. There was no acute deterioration up to this time in the emergency department. The patient has demonstrated clinical improvement and/or stability, feels better at this time according to emergency department team. Agree with goals/plan of emergency department care as described in this physician's electronic medical record, including diagnostics, therapeutics and consultation recommendation as clinically warranted. Will discharge home with close outpatient follow up with primary care physician/provider and specialist if necessary. The patient and/or family was educated on expectant management and return precautions concerning signs and features to return to the emergency department, in layman terms, including but not limited to: nausea, vomiting, fever, chills, the inability to eat, take medications, or drink, persistent/worsening symptoms or any concerns at all. There are no acute or immediate life threatening issues present on history, clinical exam, or any diagnostic evaluation. The patient is a safe disposition home, has capacity and insight into their condition, is ambulatory in the Emergency Department with no further questions and will follow up with their doctor(s) this week. Diagnosis, prognosis, natural history and treatment was discussed with patient and/or family. The patient and/or family were given the opportunity to ask questions and have them answered in full. The patient and/or family are with capacity and insight into the situation, treatment, risks, benefits, alternative therapies, and understand that they can ask any further questions if needed. Patient and/or family/guardian understands anticipatory guidance and was given strict return and follow up precautions. The patient and/or family/guardian has been informed of the necessity to follow up with the PMD/Clinic/follow up as provided within 2-3 days, and the patient and/or family/guardian reports understanding of above with capacity and insight. The patient and/or family/guardian were informed of any results of their tests and are were encouraged to follow up on the findings with their doctor as well as the need to inform their doctor of any results. All available results endorsed including unexpected incidental findings (copy of reports provided to patient). The patient and/or family/guardian are aware of the need to follow up with repeat testing as applicable and report understanding of the above with capacity and insight. The patient and/or family/guardian was made aware of any pending test results at the time of discharge and of the need to call back for the final results as well as the need to inform their doctor of the results.

## 2025-05-02 NOTE — ED ADULT NURSE REASSESSMENT NOTE - NS ED NURSE REASSESS COMMENT FT1
Schmitz catheter placed using sterile technique. Second RN present to confirm sterility. Draining to gravity. Secured w/ stat lock. Pt tolerated procedure well. Will cont to monitor.

## 2025-05-02 NOTE — ED PROVIDER NOTE - NSFOLLOWUPINSTRUCTIONS_ED_ALL_ED_FT
Follow up with your Urologist Dr Read this week for further management. Keep johnson catheter in place until you follow up.     Follow up with your PCP upon discharge.      Return to ER for fever, abdominal pain, back pain, vomiting, unable to tolerate food/fluid, difficulty urinating, weakness, passing out, or any other concerning symptoms.

## 2025-05-02 NOTE — ED PROVIDER NOTE - CLINICAL SUMMARY MEDICAL DECISION MAKING FREE TEXT BOX
adult male 1 month history of urinary difficulty seen earlier with urinary retention, Schmitz placed CT abdomen nonactionable urine negative patient was discharged home now returns complains of discomfort with urine leaking around the Schmitz, bladder scan was negative/no urine in bladder, Schmitz in place plan consult urology for recommendations.

## 2025-05-02 NOTE — ED PROVIDER NOTE - OBJECTIVE STATEMENT
63yo M seen earlier today for urinary retention s/p johnson placement returning for urine leaking around johnson insertion site. Also reports penile discomfort and noticed darkening of urine. Denies abdominal pain, fever/chills.

## 2025-05-03 LAB
CULTURE RESULTS: NO GROWTH — SIGNIFICANT CHANGE UP
SPECIMEN SOURCE: SIGNIFICANT CHANGE UP

## 2025-05-06 ENCOUNTER — NON-APPOINTMENT (OUTPATIENT)
Age: 65
End: 2025-05-06

## 2025-05-07 ENCOUNTER — APPOINTMENT (OUTPATIENT)
Dept: UROLOGY | Facility: CLINIC | Age: 65
End: 2025-05-07